# Patient Record
Sex: FEMALE | Race: WHITE | NOT HISPANIC OR LATINO | Employment: OTHER | URBAN - NONMETROPOLITAN AREA
[De-identification: names, ages, dates, MRNs, and addresses within clinical notes are randomized per-mention and may not be internally consistent; named-entity substitution may affect disease eponyms.]

---

## 2017-01-27 ENCOUNTER — ANTICOAGULATION VISIT (OUTPATIENT)
Dept: CARDIOLOGY | Facility: CLINIC | Age: 82
End: 2017-01-27

## 2017-01-27 ENCOUNTER — LAB (OUTPATIENT)
Dept: LAB | Facility: HOSPITAL | Age: 82
End: 2017-01-27

## 2017-01-27 ENCOUNTER — OFFICE VISIT (OUTPATIENT)
Dept: CARDIOLOGY | Facility: CLINIC | Age: 82
End: 2017-01-27

## 2017-01-27 VITALS
DIASTOLIC BLOOD PRESSURE: 70 MMHG | BODY MASS INDEX: 31.99 KG/M2 | HEART RATE: 100 BPM | SYSTOLIC BLOOD PRESSURE: 146 MMHG | HEIGHT: 65 IN | WEIGHT: 192 LBS

## 2017-01-27 DIAGNOSIS — Z79.01 LONG TERM (CURRENT) USE OF ANTICOAGULANTS: ICD-10-CM

## 2017-01-27 DIAGNOSIS — I48.19 PERSISTENT ATRIAL FIBRILLATION (HCC): Primary | ICD-10-CM

## 2017-01-27 DIAGNOSIS — I48.20 CHRONIC ATRIAL FIBRILLATION (HCC): Primary | ICD-10-CM

## 2017-01-27 DIAGNOSIS — E78.2 MIXED HYPERLIPIDEMIA: ICD-10-CM

## 2017-01-27 DIAGNOSIS — Z98.890 S/P MVR (MITRAL VALVE REPAIR): ICD-10-CM

## 2017-01-27 DIAGNOSIS — I10 ESSENTIAL HYPERTENSION: ICD-10-CM

## 2017-01-27 LAB
ALBUMIN SERPL-MCNC: 3.8 G/DL (ref 3.4–4.8)
ALBUMIN/GLOB SERPL: 1.2 G/DL (ref 1.1–1.8)
ALP SERPL-CCNC: 66 U/L (ref 38–126)
ALT SERPL W P-5'-P-CCNC: 31 U/L (ref 9–52)
ANION GAP SERPL CALCULATED.3IONS-SCNC: 8 MMOL/L (ref 5–15)
ARTICHOKE IGE QN: 138 MG/DL (ref 1–129)
AST SERPL-CCNC: 24 U/L (ref 14–36)
BASOPHILS # BLD AUTO: 0.04 10*3/MM3 (ref 0–0.2)
BASOPHILS NFR BLD AUTO: 0.6 % (ref 0–2)
BILIRUB SERPL-MCNC: 0.8 MG/DL (ref 0.2–1.3)
BUN BLD-MCNC: 21 MG/DL (ref 7–21)
BUN/CREAT SERPL: 22.3 (ref 7–25)
CALCIUM SPEC-SCNC: 9.7 MG/DL (ref 8.4–10.2)
CHLORIDE SERPL-SCNC: 103 MMOL/L (ref 95–110)
CHOLEST SERPL-MCNC: 234 MG/DL (ref 0–199)
CO2 SERPL-SCNC: 28 MMOL/L (ref 22–31)
CREAT BLD-MCNC: 0.94 MG/DL (ref 0.5–1)
DEPRECATED RDW RBC AUTO: 48.3 FL (ref 36.4–46.3)
EOSINOPHIL # BLD AUTO: 0.12 10*3/MM3 (ref 0–0.7)
EOSINOPHIL NFR BLD AUTO: 1.9 % (ref 0–7)
ERYTHROCYTE [DISTWIDTH] IN BLOOD BY AUTOMATED COUNT: 14.3 % (ref 11.5–14.5)
GFR SERPL CREATININE-BSD FRML MDRD: 57 ML/MIN/1.73 (ref 60–90)
GLOBULIN UR ELPH-MCNC: 3.3 GM/DL (ref 2.3–3.5)
GLUCOSE BLD-MCNC: 86 MG/DL (ref 60–100)
HCT VFR BLD AUTO: 42 % (ref 35–45)
HDLC SERPL-MCNC: 52 MG/DL (ref 60–200)
HGB BLD-MCNC: 13.8 G/DL (ref 12–15.5)
IMM GRANULOCYTES # BLD: 0 10*3/MM3 (ref 0–0.02)
IMM GRANULOCYTES NFR BLD: 0 % (ref 0–0.5)
INR PPP: 1.5 (ref 0.8–1.2)
INR PPP: 1.6
LDLC/HDLC SERPL: 2.73 {RATIO} (ref 0–3.22)
LYMPHOCYTES # BLD AUTO: 2.35 10*3/MM3 (ref 0.6–4.2)
LYMPHOCYTES NFR BLD AUTO: 38.1 % (ref 10–50)
MCH RBC QN AUTO: 30.7 PG (ref 26.5–34)
MCHC RBC AUTO-ENTMCNC: 32.9 G/DL (ref 31.4–36)
MCV RBC AUTO: 93.3 FL (ref 80–98)
MONOCYTES # BLD AUTO: 0.46 10*3/MM3 (ref 0–0.9)
MONOCYTES NFR BLD AUTO: 7.5 % (ref 0–12)
NEUTROPHILS # BLD AUTO: 3.19 10*3/MM3 (ref 2–8.6)
NEUTROPHILS NFR BLD AUTO: 51.9 % (ref 37–80)
PLATELET # BLD AUTO: 229 10*3/MM3 (ref 150–450)
PMV BLD AUTO: 9.9 FL (ref 8–12)
POTASSIUM BLD-SCNC: 5.5 MMOL/L (ref 3.5–5.1)
PROT SERPL-MCNC: 7.1 G/DL (ref 6.3–8.6)
RBC # BLD AUTO: 4.5 10*6/MM3 (ref 3.77–5.16)
SODIUM BLD-SCNC: 139 MMOL/L (ref 137–145)
T4 FREE SERPL-MCNC: 1.74 NG/DL (ref 0.78–2.19)
TRIGL SERPL-MCNC: 199 MG/DL (ref 20–199)
TSH SERPL DL<=0.05 MIU/L-ACNC: 0.08 MIU/ML (ref 0.46–4.68)
WBC NRBC COR # BLD: 6.16 10*3/MM3 (ref 3.2–9.8)

## 2017-01-27 PROCEDURE — 80053 COMPREHEN METABOLIC PANEL: CPT | Performed by: INTERNAL MEDICINE

## 2017-01-27 PROCEDURE — 80061 LIPID PANEL: CPT | Performed by: INTERNAL MEDICINE

## 2017-01-27 PROCEDURE — 85610 PROTHROMBIN TIME: CPT | Performed by: INTERNAL MEDICINE

## 2017-01-27 PROCEDURE — 36415 COLL VENOUS BLD VENIPUNCTURE: CPT | Performed by: INTERNAL MEDICINE

## 2017-01-27 PROCEDURE — 84439 ASSAY OF FREE THYROXINE: CPT | Performed by: INTERNAL MEDICINE

## 2017-01-27 PROCEDURE — 99213 OFFICE O/P EST LOW 20 MIN: CPT | Performed by: INTERNAL MEDICINE

## 2017-01-27 PROCEDURE — 85025 COMPLETE CBC W/AUTO DIFF WBC: CPT | Performed by: INTERNAL MEDICINE

## 2017-01-27 PROCEDURE — 84443 ASSAY THYROID STIM HORMONE: CPT | Performed by: INTERNAL MEDICINE

## 2017-01-27 NOTE — MR AVS SNAPSHOT
Miah Hogan   1/27/2017 2:15 PM   Office Visit    Dept Phone:  [phone]   Encounter #:  [id number]    Provider:  Kg Adams MD   Department:  [facility] CARDIOLOGY                Your Full Care Plan              Today's Medication Changes          These changes are accurate as of: 1/27/17  1:59 PM.  If you have any questions, ask your nurse or doctor.               Medication(s)that have changed:     levothyroxine 88 MCG tablet   Commonly known as:  SYNTHROID, LEVOTHROID   Take 1 tablet by mouth Daily.   What changed:  Another medication with the same name was removed. Continue taking this medication, and follow the directions you see here.   Changed by:  Celestina Donaldson CSA                  Your Updated Medication List          This list is accurate as of: 1/27/17  1:59 PM.  Always use your most recent med list.                furosemide 20 MG tablet   Commonly known as:  LASIX       levothyroxine 88 MCG tablet   Commonly known as:  SYNTHROID, LEVOTHROID       lisinopril 10 MG tablet   Commonly known as:  PRINIVIL,ZESTRIL       metoprolol succinate XL 25 MG 24 hr tablet   Commonly known as:  TOPROL-XL   Take 1 tablet by mouth Daily.       Vitamin D3 2000 UNITS tablet       warfarin 2.5 MG tablet   Commonly known as:  COUMADIN               We Performed the Following     CBC & Differential     Comprehensive Metabolic Panel     Lipid Panel     Protime-INR     T4     TSH       You Were Diagnosed With        Codes Comments    Persistent atrial fibrillation    -  Primary ICD-10-CM: I48.1  ICD-9-CM: 427.31     Long term (current) use of anticoagulants     ICD-10-CM: Z79.01  ICD-9-CM: V58.61     S/P MVR (mitral valve repair)     ICD-10-CM: Z98.890  ICD-9-CM: V45.89     Mixed hyperlipidemia     ICD-10-CM: E78.2  ICD-9-CM: 272.2     Essential hypertension     ICD-10-CM: I10  ICD-9-CM: 401.9       Instructions     None    Patient Instructions History         Upcoming Appointments     Visit Type Date Time Department    OFFICE VISIT 2017  2:15 PM [facility] HEART [facility] ECHO 2D COMPLETE [place] 2017 10:30 AM [facility]    OFFICE VISIT 2017  1:15 PM [facility]      [facility] Sign[facility]     [facility] [employer] allows you to send messages to your doctor, view your test results, renew your prescriptions, schedule appointments, and more. To sign up, go to [facility] and click on the Sign Up Now link in the New User? box. Enter your [employer] Activation Code exactly as it appears below along with the last four digits of your Social Security Number and your Date of Birth () to complete the sign-up process. If you do not sign up before the expiration date, you must request a new code.    [employer] Activation Code: [email or web address]  Expires: 2/10/2017  1:59 PM    If you have questions, you can email [email or web address] or call [phone] to talk to our [employer] staff. Remember, [employer] is NOT to be used for urgent needs. For medical emergencies, dial 911.               Other Info from Your Visit           Your Appointments     2017  2:15 PM CST   Office Visit with Kg Adams MD   [facility] CARDIOLOGY (--)    [address]  [facility] [address] [phone]           Arrive 15 minutes prior to appointment.            2017 10:30 AM CST   ECHOCARDIOGRAM 2D COMPLETE VISIT with [facility]    [address]  [facility] [address] [phone]           Bring your insurance cards with you. Wear comfortable clothing and shoes.            2017  1:15 PM CDT   Office Visit with Kg Adams MD   [facility] CARDIOLOGY (--)    [address]  [facility] [address] [phone]           Arrive 15 minutes prior to appointment.             "  Allergies     Codeine Sulfate        Vital Signs     Blood Pressure Pulse Height Weight Body Mass Index Smoking Status    146/70 100 65\" (165.1 cm) 192 lb (87.1 kg) 31.95 kg/m2 Never Smoker      Problems and Diagnoses Noted     Atrial fibrillation (irregular heartbeat)    High blood pressure    Long term use of blood thinners    Mixed hyperlipidemia    Status post mitral valve repair        "

## 2017-01-27 NOTE — PROGRESS NOTES
Miah Hogan  81 y.o. female    01/27/2017  1. Persistent atrial fibrillation    2. Long term (current) use of anticoagulants    3. S/P MVR (mitral valve repair)    4. Mixed hyperlipidemia    5. Essential hypertension        History of Present Illness    Mrs. Hogan is here for follow-up of her multiple cardiac issues.  She denied any chest pain, shortness of breath, palpitation but does have 1+ pitting edema in the lower extremities which is a relatively new symptom.  She denied any fevers, chills, cough.  She has been very compliant with her medications and a blood pressure was in the normal range.  No signs of congestive heart failure was noted and clinically her prosthetic valve is functioning well.        SUBJECTIVE    Allergies   Allergen Reactions   • Codeine Sulfate          Past Medical History   Diagnosis Date   • Atrial fibrillation    • Dizziness and giddiness    • Edema    • Hyperlipidemia    • Hypertension    • Hypothyroidism    • Hypothyroidism    • Long term (current) use of anticoagulants    • Nonrheumatic mitral valve disorder, unspecified          Past Surgical History   Procedure Laterality Date   • Mitral valve replacement     • Appendectomy           No family history on file.      Social History     Social History   • Marital status:      Spouse name: N/A   • Number of children: N/A   • Years of education: N/A     Occupational History   • Not on file.     Social History Main Topics   • Smoking status: Never Smoker   • Smokeless tobacco: Never Used   • Alcohol use No   • Drug use: No   • Sexual activity: Defer     Other Topics Concern   • Not on file     Social History Narrative         Current Outpatient Prescriptions   Medication Sig Dispense Refill   • Cholecalciferol (VITAMIN D3) 2000 UNITS tablet Take  by mouth.     • furosemide (LASIX) 20 MG tablet Take 20 mg by mouth 2 (Two) Times a Day.     • levothyroxine (SYNTHROID, LEVOTHROID) 88 MCG tablet Take 1 tablet by mouth Daily.   "6   • lisinopril (PRINIVIL,ZESTRIL) 10 MG tablet Take 10 mg by mouth Daily.     • metoprolol succinate XL (TOPROL-XL) 25 MG 24 hr tablet Take 1 tablet by mouth Daily. 30 tablet 6   • warfarin (COUMADIN) 2.5 MG tablet Take 2.5 mg by mouth Daily.       No current facility-administered medications for this visit.          OBJECTIVE    Visit Vitals   • /70   • Pulse 100   • Ht 65\" (165.1 cm)   • Wt 192 lb (87.1 kg)   • BMI 31.95 kg/m2           Review of Systems     Constitutional:  Denies recent weight loss, weight gain, fever or chills, no change in exercise tolerance     HENT:  Denies any hearing loss, epistaxis, hoarseness, or difficulty speaking.     Eyes: Wears eyeglasses or contact lenses     Respiratory:  Denies dyspnea with exertion,no cough, wheezing, or hemoptysis.     Cardiovascular: Negative for palpations, chest pain, orthopnea, PND, syncope, or claudication.     Gastrointestinal:  Denies change in bowel habits, dyspepsia, ulcer disease, hematochezia, or melena.     Endocrine: Negative for cold intolerance, heat intolerance, polydipsia, polyphagia and polyuria. Denies any history of weight change, heat/cold intolerance, polydipsia, polyuria     Genitourinary: Negative.      Musculoskeletal: Denies any history of arthritic symptoms or back problems . Leg edema    Skin:  Denies any change in hair or nails, rashes, or skin lesions.     Allergic/Immunologic: Negative.  Negative for environmental allergies, food allergies and immunocompromised state.     Neurological:  Denies any history of recurrent headaches, strokes, TIA, or seizure disorder.     Hematological: Denies any food allergies, seasonal allergies, bleeding disorders, or lymphadenopathy.     Psychiatric/Behavioral: Denies any history of depression, substance abuse, or change in cognitive function.         Physical Exam     Constitutional: Cooperative, alert and oriented, well-developed, well-nourished, in no acute distress.     HENT:   Head: " Normocephalic, normal hair patterns, no masses or tenderness.  Ears, Nose, and Throat: No gross abnormalities. No pallor or cyanosis. Dentition good.   Eyes: EOMS intact, PERRL, conjunctivae and lids unremarkable. Fundoscopic exam and visual fields not performed.   Neck: No palpable masses or adenopathy, no thyromegaly, no JVD, carotid pulses are full and equal bilaterally and without  Bruits.     Cardiovascular: Irregular rhythm, Prosthetic valve sounds,  No murmurs, gallops, or rubs detected.     Pulmonary/Chest: Chest: normal symmetry, no tenderness to palpation, normal respiratory excursion, no intercostal retraction, no use of accessory muscles.            Pulmonary: Normal breath sounds. No rales or ronchi.    Abdominal: Abdomen soft, bowel sounds normoactive, no masses, no hepatosplenomegaly, non-tender, no bruits.     Musculoskeletal: No deformities, 1+ edema both legs. There are no spinal abnormalities noted. Normal muscle strength and tone. Pulses full and equal in all extremities, no bruits auscultated.     Neurological: No gross motor or sensory deficits noted, affect appropriate, oriented to time, person, place.     Skin: Warm and dry to the touch, no apparent skin lesions or masses noted.     Psychiatric: She has a normal mood and affect. Her behavior is normal. Judgment and thought content normal.         Procedures      Lab Results   Component Value Date    WBC 5.8 09/01/2015    HGB 12.9 09/01/2015    HCT 41.1 09/01/2015    MCV 92.8 09/01/2015     09/01/2015     Lab Results   Component Value Date    GLUCOSE 86 09/01/2015    BUN 17 09/01/2015    CREATININE 1.0 09/01/2015    CO2 28 09/01/2015    CALCIUM 9.0 09/01/2015    ALBUMIN 3.3 (L) 09/01/2015    AST 20 09/01/2015    ALT 21 09/01/2015     No results found for: CHOL  Lab Results   Component Value Date    TRIG 190 09/01/2015     No results found for: HDL  Lab Results   Component Value Date    LDLCALC 127 09/01/2015     No results found for:  LDL  No results found for: HDLLDLRATIO  No components found for: CHOLHDL  No results found for: HGBA1C  Lab Results   Component Value Date    TSH 0.22 (L) 09/01/2015           ASSESSMENT AND PLAN  Mrs. Hogan is compensated with no evidence of congestive heart failure.  His suspect that her leg edema is secondary to venous stasis.  However to further evaluate her prosthetic valve and LV function and right heart pressures and echocardiogram is being arranged her RV systolic pressure was 38 mmHg in August 2015.  I note that she is not had lab work done in a long time and hence this indicated below have been ordered.  She has hyperlipidemia but is unable to take statins secondary to side effects.    Diagnoses and all orders for this visit:    Persistent atrial fibrillation  -     Comprehensive Metabolic Panel  -     CBC & Differential  -     TSH  -     T4  -     Protime-INR  -     Adult Transthoracic Echo Complete; Future  -     Lipid Panel    Long term (current) use of anticoagulants  -     Comprehensive Metabolic Panel  -     CBC & Differential  -     TSH  -     T4  -     Protime-INR  -     Adult Transthoracic Echo Complete; Future  -     Lipid Panel    S/P MVR (mitral valve repair)  -     Comprehensive Metabolic Panel  -     CBC & Differential  -     TSH  -     T4  -     Protime-INR  -     Adult Transthoracic Echo Complete; Future  -     Lipid Panel    Mixed hyperlipidemia  -     Lipid Panel    Essential hypertension  -     Lipid Panel        Kg Adams MD  1/27/2017  1:38 PM

## 2017-01-27 NOTE — PROGRESS NOTES
I have reviewed the notes, assessments, and/or procedures performed by Luis Friedman RN,and concur with her documentation of Miah Hogan.

## 2017-02-01 ENCOUNTER — RESULTS ENCOUNTER (OUTPATIENT)
Dept: CARDIOLOGY | Facility: CLINIC | Age: 82
End: 2017-02-01

## 2017-02-01 DIAGNOSIS — I48.20 CHRONIC ATRIAL FIBRILLATION (HCC): ICD-10-CM

## 2017-02-09 ENCOUNTER — DOCUMENTATION (OUTPATIENT)
Dept: CARDIOLOGY | Facility: CLINIC | Age: 82
End: 2017-02-09

## 2017-02-17 ENCOUNTER — ANTICOAGULATION VISIT (OUTPATIENT)
Dept: CARDIOLOGY | Facility: CLINIC | Age: 82
End: 2017-02-17

## 2017-02-17 LAB — INR PPP: 2.1

## 2017-02-19 NOTE — PROGRESS NOTES
I have reviewed the notes, assessments, and/or procedures performed by Luis Friedman RN, and concur with her documentation of Miah Hogan.

## 2017-02-24 ENCOUNTER — RESULTS ENCOUNTER (OUTPATIENT)
Dept: CARDIOLOGY | Facility: CLINIC | Age: 82
End: 2017-02-24

## 2017-02-24 DIAGNOSIS — I48.20 CHRONIC ATRIAL FIBRILLATION (HCC): ICD-10-CM

## 2017-03-24 ENCOUNTER — RESULTS ENCOUNTER (OUTPATIENT)
Dept: CARDIOLOGY | Facility: CLINIC | Age: 82
End: 2017-03-24

## 2017-03-24 DIAGNOSIS — I48.20 CHRONIC ATRIAL FIBRILLATION (HCC): ICD-10-CM

## 2017-04-04 ENCOUNTER — ANTICOAGULATION VISIT (OUTPATIENT)
Dept: CARDIOLOGY | Facility: CLINIC | Age: 82
End: 2017-04-04

## 2017-04-04 ENCOUNTER — APPOINTMENT (OUTPATIENT)
Dept: LAB | Facility: HOSPITAL | Age: 82
End: 2017-04-04

## 2017-04-04 DIAGNOSIS — I48.19 PERSISTENT ATRIAL FIBRILLATION (HCC): Primary | ICD-10-CM

## 2017-04-04 LAB
INR PPP: 2.3
INR PPP: 2.3 (ref 0.8–1.2)

## 2017-04-04 PROCEDURE — 85610 PROTHROMBIN TIME: CPT | Performed by: INTERNAL MEDICINE

## 2017-04-21 ENCOUNTER — RESULTS ENCOUNTER (OUTPATIENT)
Dept: CARDIOLOGY | Facility: CLINIC | Age: 82
End: 2017-04-21

## 2017-04-21 DIAGNOSIS — I48.20 CHRONIC ATRIAL FIBRILLATION (HCC): ICD-10-CM

## 2017-05-19 ENCOUNTER — RESULTS ENCOUNTER (OUTPATIENT)
Dept: CARDIOLOGY | Facility: CLINIC | Age: 82
End: 2017-05-19

## 2017-05-19 DIAGNOSIS — I48.20 CHRONIC ATRIAL FIBRILLATION (HCC): ICD-10-CM

## 2017-05-26 RX ORDER — METOPROLOL SUCCINATE 25 MG/1
25 TABLET, EXTENDED RELEASE ORAL DAILY
Qty: 30 TABLET | Refills: 6 | Status: SHIPPED | OUTPATIENT
Start: 2017-05-26 | End: 2017-12-26 | Stop reason: SDUPTHER

## 2017-06-16 ENCOUNTER — RESULTS ENCOUNTER (OUTPATIENT)
Dept: CARDIOLOGY | Facility: CLINIC | Age: 82
End: 2017-06-16

## 2017-06-16 DIAGNOSIS — I48.20 CHRONIC ATRIAL FIBRILLATION (HCC): ICD-10-CM

## 2017-06-27 ENCOUNTER — ANTICOAGULATION VISIT (OUTPATIENT)
Dept: CARDIOLOGY | Facility: CLINIC | Age: 82
End: 2017-06-27

## 2017-06-27 LAB — INR PPP: 1.8

## 2017-07-07 RX ORDER — WARFARIN SODIUM 2.5 MG/1
2.5 TABLET ORAL
Qty: 30 TABLET | Refills: 6 | Status: SHIPPED | OUTPATIENT
Start: 2017-07-07 | End: 2018-08-16 | Stop reason: SDUPTHER

## 2017-07-14 ENCOUNTER — RESULTS ENCOUNTER (OUTPATIENT)
Dept: CARDIOLOGY | Facility: CLINIC | Age: 82
End: 2017-07-14

## 2017-07-14 DIAGNOSIS — I48.20 CHRONIC ATRIAL FIBRILLATION (HCC): ICD-10-CM

## 2017-08-02 ENCOUNTER — ANTICOAGULATION VISIT (OUTPATIENT)
Dept: CARDIOLOGY | Facility: CLINIC | Age: 82
End: 2017-08-02

## 2017-08-02 ENCOUNTER — OFFICE VISIT (OUTPATIENT)
Dept: CARDIOLOGY | Facility: CLINIC | Age: 82
End: 2017-08-02

## 2017-08-02 VITALS
DIASTOLIC BLOOD PRESSURE: 100 MMHG | BODY MASS INDEX: 32.49 KG/M2 | SYSTOLIC BLOOD PRESSURE: 140 MMHG | HEIGHT: 65 IN | HEART RATE: 88 BPM | WEIGHT: 195 LBS

## 2017-08-02 DIAGNOSIS — I10 ESSENTIAL HYPERTENSION: ICD-10-CM

## 2017-08-02 DIAGNOSIS — I48.19 PERSISTENT ATRIAL FIBRILLATION (HCC): Primary | ICD-10-CM

## 2017-08-02 DIAGNOSIS — Z98.890 S/P MVR (MITRAL VALVE REPAIR): ICD-10-CM

## 2017-08-02 DIAGNOSIS — E78.2 MIXED HYPERLIPIDEMIA: ICD-10-CM

## 2017-08-02 LAB
INR PPP: 2.3
INR PPP: 2.3

## 2017-08-02 PROCEDURE — 99214 OFFICE O/P EST MOD 30 MIN: CPT | Performed by: INTERNAL MEDICINE

## 2017-08-02 PROCEDURE — 93000 ELECTROCARDIOGRAM COMPLETE: CPT | Performed by: INTERNAL MEDICINE

## 2017-08-02 NOTE — PROGRESS NOTES
Miah Hogan  81 y.o. female    08/02/2017  1. Persistent atrial fibrillation    2. Essential hypertension    3. Mixed hyperlipidemia    4. S/P MVR (mitral valve repair)        History of Present Illness    Mrs. Hogan is here for follow-up of for above stated problems.  She denied any chest pain, palpitation, dizziness or syncope.  There was no shortness of breath.  No signs of congestive heart failure were noted.  Her mechanical prosthetic valve is functioning well.  EKG showed a heart rate of 88 bpm with underlying atrial fibrillation with nonspecific T-wave changes.  She has not had her PT and INR checked in more than a month and hence this will be checked today.  Her blood pressure was noted to be elevated at 140/100 mmHg and when I rechecked it was 130/80 mmHg.  I've asked her to keep a close watch on her blood pressure.        SUBJECTIVE    Allergies   Allergen Reactions   • Codeine Sulfate          Past Medical History:   Diagnosis Date   • Atrial fibrillation    • Dizziness and giddiness    • Edema    • Hyperlipidemia    • Hypertension    • Hypothyroidism    • Hypothyroidism    • Long term (current) use of anticoagulants    • Nonrheumatic mitral valve disorder, unspecified          Past Surgical History:   Procedure Laterality Date   • APPENDECTOMY     • MITRAL VALVE REPLACEMENT           History reviewed. No pertinent family history.      Social History     Social History   • Marital status:      Spouse name: N/A   • Number of children: N/A   • Years of education: N/A     Occupational History   • Not on file.     Social History Main Topics   • Smoking status: Never Smoker   • Smokeless tobacco: Never Used   • Alcohol use No   • Drug use: No   • Sexual activity: Defer     Other Topics Concern   • Not on file     Social History Narrative         Current Outpatient Prescriptions   Medication Sig Dispense Refill   • Cholecalciferol (VITAMIN D3) 2000 UNITS tablet Take  by mouth.     • furosemide  "(LASIX) 20 MG tablet Take 20 mg by mouth 2 (Two) Times a Day.     • levothyroxine (SYNTHROID, LEVOTHROID) 88 MCG tablet Take 1 tablet by mouth Daily.  6   • lisinopril (PRINIVIL,ZESTRIL) 10 MG tablet Take 10 mg by mouth Daily.     • metoprolol succinate XL (TOPROL-XL) 25 MG 24 hr tablet Take 1 tablet by mouth Daily. 30 tablet 6   • warfarin (COUMADIN) 2.5 MG tablet Take 1 tablet by mouth Daily. 30 tablet 6     No current facility-administered medications for this visit.          OBJECTIVE    /100  Pulse 88  Ht 65\" (165.1 cm)  Wt 195 lb (88.5 kg)  BMI 32.45 kg/m2        Review of Systems     Constitutional:  Denies recent weight loss, weight gain, fever or chills, no change in exercise tolerance     HENT:  Denies any hearing loss, epistaxis, hoarseness, or difficulty speaking.     Eyes: Wears eyeglasses or contact lenses     Respiratory:  Denies dyspnea with exertion,no cough, wheezing, or hemoptysis.     Cardiovascular: Negative for palpations, chest pain, orthopnea, PND, peripheral edema, syncope, or claudication.     Gastrointestinal:  Denies change in bowel habits, dyspepsia, ulcer disease, hematochezia, or melena.     Endocrine: Negative for cold intolerance, heat intolerance, polydipsia, polyphagia and polyuria.    Genitourinary: Negative.      Musculoskeletal: Denies any history of arthritic symptoms or back problems     Skin:  Denies any change in hair or nails, rashes, or skin lesions.     Allergic/Immunologic: Negative.  Negative for environmental allergies, food allergies and immunocompromised state.     Neurological: oaacsional headaches, no strokes, TIA, or seizure disorder.     Hematological: Denies any food allergies, seasonal allergies, bleeding disorders, or lymphadenopathy.     Psychiatric/Behavioral: Denies any history of depression, substance abuse, or change in cognitive function.         Physical Exam     Constitutional: Cooperative, alert and oriented,  in no acute distress.     HENT: "   Head: Normocephalic, normal hair patterns, no masses or tenderness.  Ears, Nose, and Throat: No gross abnormalities. No pallor or cyanosis.   Eyes: EOMS intact, PERRL, conjunctivae and lids unremarkable. Fundoscopic exam and visual fields not performed.   Neck: No palpable masses or adenopathy, no thyromegaly, no JVD, carotid pulses are full and equal bilaterally and without  Bruits.     Cardiovascular: Prosthetic valve sounds heard, no S3 or S4. No gallops, or rubs detected.     Pulmonary/Chest: Chest: normal symmetry, no tenderness to palpation, no intercostal retraction, no use of accessory muscles.            Pulmonary: Normal breath sounds. No rales or ronchi.    Abdominal: Abdomen soft, bowel sounds normoactive, no masses, no hepatosplenomegaly, non-tender, no bruits.     Musculoskeletal: No deformities, clubbing, cyanosis, erythema, or edema observed.     Neurological: No gross motor or sensory deficits noted, affect appropriate, oriented to time, person, place.     Skin: Warm and dry to the touch, no apparent skin lesions or masses noted.     Psychiatric: She has a normal mood and affect. Her behavior is normal. Judgment and thought content normal.           ECG 12 Lead  Date/Time: 8/2/2017 11:29 AM  Performed by: ZAC HERNANDEZ  Authorized by: ZAC HERNANDEZ   Comparison: not compared with previous ECG   Rhythm: atrial fibrillation  Rate: normal  QRS axis: normal  Comments: EKG showed sinus rhythm heart rate of 88 bpm.  Minimal nonspecific T-wave changes.              Lab Results   Component Value Date    WBC 6.16 01/27/2017    HGB 13.8 01/27/2017    HCT 42.0 01/27/2017    MCV 93.3 01/27/2017     01/27/2017     Lab Results   Component Value Date    GLUCOSE 86 01/27/2017    BUN 21 01/27/2017    CREATININE 0.94 01/27/2017    EGFRIFNONA 57 (L) 01/27/2017    BCR 22.3 01/27/2017    CO2 28.0 01/27/2017    CALCIUM 9.7 01/27/2017    ALBUMIN 3.80 01/27/2017    LABIL2 1.2 01/27/2017     AST 24 01/27/2017    ALT 31 01/27/2017     Lab Results   Component Value Date    CHOL 234 (H) 01/27/2017     Lab Results   Component Value Date    TRIG 199 01/27/2017    TRIG 190 09/01/2015     Lab Results   Component Value Date    HDL 52 (L) 01/27/2017     Lab Results   Component Value Date    LDLCALC 127 09/01/2015     No results found for: LDL  No results found for: HDLLDLRATIO  No components found for: CHOLHDL  No results found for: HGBA1C  Lab Results   Component Value Date    TSH 0.080 (L) 01/27/2017           ASSESSMENT AND PLAN    Mrs. Hogan is stable with no evidence of angina or congestive heart failure.  Her mechanical prosthetic valve is functioning well.  I have continued her present combination of lisinopril, Toprol-XL and Lasix and anticoagulation with warfarin has been continued.  Her PT and INR will be checked today and the dose of Coumadin adjusted.  I have asked her to keep a close watch on her blood pressure and if this is elevated in the future we could consider increasing the dose of lisinopril to 20 mg daily.  Diagnoses and all orders for this visit:    Persistent atrial fibrillation    Essential hypertension    Mixed hyperlipidemia    S/P MVR (mitral valve repair)        Kg Adams MD  8/2/2017  11:24 AM

## 2017-08-11 ENCOUNTER — RESULTS ENCOUNTER (OUTPATIENT)
Dept: CARDIOLOGY | Facility: CLINIC | Age: 82
End: 2017-08-11

## 2017-08-11 DIAGNOSIS — I48.20 CHRONIC ATRIAL FIBRILLATION (HCC): ICD-10-CM

## 2017-09-08 ENCOUNTER — RESULTS ENCOUNTER (OUTPATIENT)
Dept: CARDIOLOGY | Facility: CLINIC | Age: 82
End: 2017-09-08

## 2017-09-08 DIAGNOSIS — I48.20 CHRONIC ATRIAL FIBRILLATION (HCC): ICD-10-CM

## 2017-10-06 ENCOUNTER — RESULTS ENCOUNTER (OUTPATIENT)
Dept: CARDIOLOGY | Facility: CLINIC | Age: 82
End: 2017-10-06

## 2017-10-06 DIAGNOSIS — I48.20 CHRONIC ATRIAL FIBRILLATION (HCC): ICD-10-CM

## 2017-11-03 ENCOUNTER — RESULTS ENCOUNTER (OUTPATIENT)
Dept: CARDIOLOGY | Facility: CLINIC | Age: 82
End: 2017-11-03

## 2017-11-03 DIAGNOSIS — I48.20 CHRONIC ATRIAL FIBRILLATION (HCC): ICD-10-CM

## 2017-11-09 DIAGNOSIS — I48.20 CHRONIC ATRIAL FIBRILLATION (HCC): Primary | ICD-10-CM

## 2017-11-14 ENCOUNTER — ANTICOAGULATION VISIT (OUTPATIENT)
Dept: CARDIOLOGY | Facility: CLINIC | Age: 82
End: 2017-11-14

## 2017-11-14 LAB — INR PPP: 1.8

## 2017-12-01 ENCOUNTER — RESULTS ENCOUNTER (OUTPATIENT)
Dept: CARDIOLOGY | Facility: CLINIC | Age: 82
End: 2017-12-01

## 2017-12-01 DIAGNOSIS — I48.20 CHRONIC ATRIAL FIBRILLATION (HCC): ICD-10-CM

## 2017-12-13 ENCOUNTER — DOCUMENTATION (OUTPATIENT)
Dept: CARDIAC SURGERY | Facility: CLINIC | Age: 82
End: 2017-12-13

## 2017-12-13 NOTE — PROGRESS NOTES
Late entry for 12/12- Attempted to call pt and schedule appt with [facility]. Left message for pt to call us back.

## 2017-12-19 ENCOUNTER — DOCUMENTATION (OUTPATIENT)
Dept: CARDIAC SURGERY | Facility: CLINIC | Age: 82
End: 2017-12-19

## 2017-12-19 NOTE — PROGRESS NOTES
Spoke to pt regarding merge with [facility] and the need for an appt. Pt states she will need to talk to her  and call back for an appt.

## 2017-12-26 RX ORDER — METOPROLOL SUCCINATE 25 MG/1
25 TABLET, EXTENDED RELEASE ORAL DAILY
Qty: 30 TABLET | Refills: 6 | Status: SHIPPED | OUTPATIENT
Start: 2017-12-26 | End: 2018-03-15

## 2017-12-29 ENCOUNTER — ANTICOAGULATION VISIT (OUTPATIENT)
Dept: CARDIAC SURGERY | Facility: CLINIC | Age: 82
End: 2017-12-29

## 2017-12-29 ENCOUNTER — RESULTS ENCOUNTER (OUTPATIENT)
Dept: CARDIOLOGY | Facility: CLINIC | Age: 82
End: 2017-12-29

## 2017-12-29 DIAGNOSIS — Z98.890 S/P MVR (MITRAL VALVE REPAIR): ICD-10-CM

## 2017-12-29 DIAGNOSIS — Z79.01 LONG TERM CURRENT USE OF ANTICOAGULANT THERAPY: ICD-10-CM

## 2017-12-29 DIAGNOSIS — I48.20 CHRONIC ATRIAL FIBRILLATION (HCC): ICD-10-CM

## 2017-12-29 DIAGNOSIS — I48.91 ATRIAL FIBRILLATION, UNSPECIFIED TYPE (HCC): ICD-10-CM

## 2017-12-29 LAB — INR PPP: 2.5 (ref 0.9–1.1)

## 2017-12-29 PROCEDURE — 85610 PROTHROMBIN TIME: CPT | Performed by: NURSE PRACTITIONER

## 2017-12-29 PROCEDURE — 99211 OFF/OP EST MAY X REQ PHY/QHP: CPT | Performed by: NURSE PRACTITIONER

## 2017-12-29 NOTE — PROGRESS NOTES
Pt here today for first visit with [facility] ClicicPT states compliant c tx. Denies any bleeding issues or s/s of blood clot. PT is starting Amoxicillin today for 10 days. PT does not wish to return to [facility] for 1 month so dose was decreased only 1 day and pt will consume green every 3 days. Appt made. Patient instructed regarding medication; results given and questions answered. Nutritional counseling given.  Dietary factors affecting therapy addressed.  Patient instructed to monitor for excessive bruising or bleeding. PT verbalizes understanding.   Electronically signed by EBONY Moeller

## 2018-01-24 ENCOUNTER — DOCUMENTATION (OUTPATIENT)
Dept: CARDIAC SURGERY | Facility: CLINIC | Age: 83
End: 2018-01-24

## 2018-01-30 ENCOUNTER — ANTICOAGULATION VISIT (OUTPATIENT)
Dept: CARDIAC SURGERY | Facility: CLINIC | Age: 83
End: 2018-01-30

## 2018-01-30 VITALS — SYSTOLIC BLOOD PRESSURE: 123 MMHG | DIASTOLIC BLOOD PRESSURE: 69 MMHG | HEART RATE: 84 BPM

## 2018-01-30 DIAGNOSIS — I48.91 ATRIAL FIBRILLATION, UNSPECIFIED TYPE (HCC): ICD-10-CM

## 2018-01-30 DIAGNOSIS — Z79.01 LONG TERM CURRENT USE OF ANTICOAGULANT THERAPY: ICD-10-CM

## 2018-01-30 DIAGNOSIS — Z98.890 S/P MVR (MITRAL VALVE REPAIR): ICD-10-CM

## 2018-01-30 LAB — INR PPP: 1.7 (ref 0.9–1.1)

## 2018-01-30 PROCEDURE — 85610 PROTHROMBIN TIME: CPT | Performed by: NURSE PRACTITIONER

## 2018-01-30 PROCEDURE — 99211 OFF/OP EST MAY X REQ PHY/QHP: CPT | Performed by: NURSE PRACTITIONER

## 2018-01-30 NOTE — PROGRESS NOTES
Pt denies med changes or bleeding problems. Pt denies missed doses or excessive vit K intake. Pt did not wish to return for 1 month but agreed to come next week since she will be here seeing Dr Adams anyways. Dose adjusted today and pt instructed to avoid green veggies 3-4 days; pt verbalized. Patient instructed regarding medication; results given and questions answered. Nutritional counseling given.  Dietary factors affecting therapy addressed.  Patient instructed to monitor for excessive bruising or bleeding. Will recheck next week.    **Actual dose today 6.25mg -- computer only allows 0.5mg increments therefore it shows 6.5mg  Electronically signed by EBONY Moeller

## 2018-02-27 ENCOUNTER — ANTICOAGULATION VISIT (OUTPATIENT)
Dept: CARDIAC SURGERY | Facility: CLINIC | Age: 83
End: 2018-02-27

## 2018-02-27 VITALS — DIASTOLIC BLOOD PRESSURE: 71 MMHG | HEART RATE: 84 BPM | SYSTOLIC BLOOD PRESSURE: 125 MMHG

## 2018-02-27 DIAGNOSIS — Z98.890 S/P MVR (MITRAL VALVE REPAIR): ICD-10-CM

## 2018-02-27 DIAGNOSIS — I48.91 ATRIAL FIBRILLATION, UNSPECIFIED TYPE (HCC): ICD-10-CM

## 2018-02-27 DIAGNOSIS — Z79.01 LONG TERM CURRENT USE OF ANTICOAGULANT THERAPY: ICD-10-CM

## 2018-02-27 LAB — INR PPP: 1.6 (ref 0.9–1.1)

## 2018-02-27 PROCEDURE — 99211 OFF/OP EST MAY X REQ PHY/QHP: CPT | Performed by: NURSE PRACTITIONER

## 2018-02-27 PROCEDURE — 85610 PROTHROMBIN TIME: CPT | Performed by: NURSE PRACTITIONER

## 2018-02-27 NOTE — PROGRESS NOTES
Pt is here today for overdue INR. Pt states she picked up a new medication last Friday. I verified with [facility] that pt is taking Macrobid. Pt denies bleeding problems. Due to INR being low again dose was adjusted. Patient instructed regarding medication; results given and questions answered. Nutritional counseling given.  Dietary factors affecting therapy addressed.  Patient instructed to monitor for excessive bruising or bleeding. Will recheck in 2 weeks when pt agrees to return.           This document has been electronically signed by EBONY Morocho on February 28, 2018 4:22 PM

## 2018-03-13 ENCOUNTER — ANTICOAGULATION VISIT (OUTPATIENT)
Dept: CARDIAC SURGERY | Facility: CLINIC | Age: 83
End: 2018-03-13

## 2018-03-13 VITALS — SYSTOLIC BLOOD PRESSURE: 118 MMHG | HEART RATE: 88 BPM | DIASTOLIC BLOOD PRESSURE: 60 MMHG

## 2018-03-13 DIAGNOSIS — Z98.890 S/P MVR (MITRAL VALVE REPAIR): ICD-10-CM

## 2018-03-13 DIAGNOSIS — Z79.01 LONG TERM CURRENT USE OF ANTICOAGULANT THERAPY: ICD-10-CM

## 2018-03-13 DIAGNOSIS — I48.91 ATRIAL FIBRILLATION, UNSPECIFIED TYPE (HCC): ICD-10-CM

## 2018-03-13 LAB — INR PPP: 3.1 (ref 0.9–1.1)

## 2018-03-13 PROCEDURE — 99211 OFF/OP EST MAY X REQ PHY/QHP: CPT | Performed by: NURSE PRACTITIONER

## 2018-03-13 PROCEDURE — 85610 PROTHROMBIN TIME: CPT | Performed by: NURSE PRACTITIONER

## 2018-03-13 NOTE — PROGRESS NOTES
Pt states she is being taken off her metoprolol and Vit D.  Pt denies bleeding issues.  Adjusted coumadin dose for today only and instructed pt to increase Vit K intake today with a dark salad.  Recheck INR in two wks.  Pt verbalizes.  Patient instructed regarding medication; results given and questions answered. Nutritional counseling given.  Dietary factors affecting therapy addressed.  Patient instructed to monitor for excessive bruising or bleeding.          This document has been electronically signed by EBONY Morocho on March 14, 2018 9:37 AM

## 2018-03-15 ENCOUNTER — APPOINTMENT (OUTPATIENT)
Dept: GENERAL RADIOLOGY | Facility: HOSPITAL | Age: 83
End: 2018-03-15

## 2018-03-15 ENCOUNTER — HOSPITAL ENCOUNTER (OUTPATIENT)
Facility: HOSPITAL | Age: 83
Setting detail: OBSERVATION
Discharge: HOME | End: 2018-03-16
Attending: EMERGENCY MEDICINE | Admitting: FAMILY MEDICINE

## 2018-03-15 ENCOUNTER — DOCUMENTATION (OUTPATIENT)
Dept: CARDIOLOGY | Facility: CLINIC | Age: 83
End: 2018-03-15

## 2018-03-15 DIAGNOSIS — I48.19 ATRIAL FIBRILLATION, PERSISTENT (HCC): Primary | ICD-10-CM

## 2018-03-15 DIAGNOSIS — I16.0 HYPERTENSIVE URGENCY: ICD-10-CM

## 2018-03-15 DIAGNOSIS — L50.9 URTICARIA: ICD-10-CM

## 2018-03-15 PROBLEM — T78.3XXA ANGIOEDEMA: Status: ACTIVE | Noted: 2018-03-15

## 2018-03-15 LAB
ALBUMIN SERPL-MCNC: 3.7 G/DL (ref 3.4–4.8)
ALBUMIN/GLOB SERPL: 1.2 G/DL (ref 1.1–1.8)
ALP SERPL-CCNC: 65 U/L (ref 38–126)
ALT SERPL W P-5'-P-CCNC: 34 U/L (ref 9–52)
ANION GAP SERPL CALCULATED.3IONS-SCNC: 13 MMOL/L (ref 5–15)
APTT PPP: 58.2 SECONDS (ref 20–40.3)
AST SERPL-CCNC: 28 U/L (ref 14–36)
BASOPHILS # BLD AUTO: 0.01 10*3/MM3 (ref 0–0.2)
BASOPHILS NFR BLD AUTO: 0.1 % (ref 0–2)
BILIRUB SERPL-MCNC: 1 MG/DL (ref 0.2–1.3)
BUN BLD-MCNC: 29 MG/DL (ref 7–21)
BUN/CREAT SERPL: 33.7 (ref 7–25)
CALCIUM SPEC-SCNC: 9.2 MG/DL (ref 8.4–10.2)
CHLORIDE SERPL-SCNC: 105 MMOL/L (ref 95–110)
CO2 SERPL-SCNC: 25 MMOL/L (ref 22–31)
CREAT BLD-MCNC: 0.86 MG/DL (ref 0.5–1)
DEPRECATED RDW RBC AUTO: 48.6 FL (ref 36.4–46.3)
EOSINOPHIL # BLD AUTO: 0.07 10*3/MM3 (ref 0–0.7)
EOSINOPHIL NFR BLD AUTO: 0.6 % (ref 0–7)
ERYTHROCYTE [DISTWIDTH] IN BLOOD BY AUTOMATED COUNT: 14.4 % (ref 11.5–14.5)
GFR SERPL CREATININE-BSD FRML MDRD: 63 ML/MIN/1.73 (ref 39–90)
GLOBULIN UR ELPH-MCNC: 3.2 GM/DL (ref 2.3–3.5)
GLUCOSE BLD-MCNC: 98 MG/DL (ref 60–100)
HCT VFR BLD AUTO: 45.8 % (ref 35–45)
HGB BLD-MCNC: 15.4 G/DL (ref 12–15.5)
HOLD SPECIMEN: NORMAL
HOLD SPECIMEN: NORMAL
IMM GRANULOCYTES # BLD: 0.04 10*3/MM3 (ref 0–0.02)
IMM GRANULOCYTES NFR BLD: 0.3 % (ref 0–0.5)
INR PPP: 3.18 (ref 0.8–1.2)
LYMPHOCYTES # BLD AUTO: 3.05 10*3/MM3 (ref 0.6–4.2)
LYMPHOCYTES NFR BLD AUTO: 25.8 % (ref 10–50)
MCH RBC QN AUTO: 31.1 PG (ref 26.5–34)
MCHC RBC AUTO-ENTMCNC: 33.6 G/DL (ref 31.4–36)
MCV RBC AUTO: 92.5 FL (ref 80–98)
MONOCYTES # BLD AUTO: 0.74 10*3/MM3 (ref 0–0.9)
MONOCYTES NFR BLD AUTO: 6.3 % (ref 0–12)
NEUTROPHILS # BLD AUTO: 7.93 10*3/MM3 (ref 2–8.6)
NEUTROPHILS NFR BLD AUTO: 66.9 % (ref 37–80)
PLATELET # BLD AUTO: 244 10*3/MM3 (ref 150–450)
PMV BLD AUTO: 9.8 FL (ref 8–12)
POTASSIUM BLD-SCNC: 3.7 MMOL/L (ref 3.5–5.1)
PROT SERPL-MCNC: 6.9 G/DL (ref 6.3–8.6)
PROTHROMBIN TIME: 31 SECONDS (ref 11.1–15.3)
RBC # BLD AUTO: 4.95 10*6/MM3 (ref 3.77–5.16)
SODIUM BLD-SCNC: 143 MMOL/L (ref 137–145)
WBC NRBC COR # BLD: 11.84 10*3/MM3 (ref 3.2–9.8)
WHOLE BLOOD HOLD SPECIMEN: NORMAL
WHOLE BLOOD HOLD SPECIMEN: NORMAL

## 2018-03-15 PROCEDURE — 93005 ELECTROCARDIOGRAM TRACING: CPT | Performed by: EMERGENCY MEDICINE

## 2018-03-15 PROCEDURE — 80053 COMPREHEN METABOLIC PANEL: CPT | Performed by: EMERGENCY MEDICINE

## 2018-03-15 PROCEDURE — G0378 HOSPITAL OBSERVATION PER HR: HCPCS

## 2018-03-15 PROCEDURE — 99284 EMERGENCY DEPT VISIT MOD MDM: CPT

## 2018-03-15 PROCEDURE — 85610 PROTHROMBIN TIME: CPT | Performed by: EMERGENCY MEDICINE

## 2018-03-15 PROCEDURE — 85730 THROMBOPLASTIN TIME PARTIAL: CPT | Performed by: EMERGENCY MEDICINE

## 2018-03-15 PROCEDURE — 99219 PR INITIAL OBSERVATION CARE/DAY 50 MINUTES: CPT | Performed by: FAMILY MEDICINE

## 2018-03-15 PROCEDURE — 25010000002 DIPHENHYDRAMINE PER 50 MG: Performed by: EMERGENCY MEDICINE

## 2018-03-15 PROCEDURE — 25010000002 METHYLPREDNISOLONE PER 125 MG: Performed by: EMERGENCY MEDICINE

## 2018-03-15 PROCEDURE — 85025 COMPLETE CBC W/AUTO DIFF WBC: CPT | Performed by: EMERGENCY MEDICINE

## 2018-03-15 PROCEDURE — 96376 TX/PRO/DX INJ SAME DRUG ADON: CPT

## 2018-03-15 PROCEDURE — 96375 TX/PRO/DX INJ NEW DRUG ADDON: CPT

## 2018-03-15 PROCEDURE — 71045 X-RAY EXAM CHEST 1 VIEW: CPT

## 2018-03-15 PROCEDURE — 96374 THER/PROPH/DIAG INJ IV PUSH: CPT

## 2018-03-15 PROCEDURE — 93010 ELECTROCARDIOGRAM REPORT: CPT | Performed by: INTERNAL MEDICINE

## 2018-03-15 RX ORDER — LABETALOL HYDROCHLORIDE 5 MG/ML
10 INJECTION, SOLUTION INTRAVENOUS ONCE
Status: COMPLETED | OUTPATIENT
Start: 2018-03-15 | End: 2018-03-15

## 2018-03-15 RX ORDER — FAMOTIDINE 20 MG/1
20 TABLET, FILM COATED ORAL DAILY
Status: DISCONTINUED | OUTPATIENT
Start: 2018-03-16 | End: 2018-03-16 | Stop reason: HOSPADM

## 2018-03-15 RX ORDER — WARFARIN SODIUM 2.5 MG/1
2.5 TABLET ORAL
Status: DISCONTINUED | OUTPATIENT
Start: 2018-03-15 | End: 2018-03-16 | Stop reason: HOSPADM

## 2018-03-15 RX ORDER — DIPHENHYDRAMINE HYDROCHLORIDE 50 MG/ML
25 INJECTION INTRAMUSCULAR; INTRAVENOUS ONCE
Status: COMPLETED | OUTPATIENT
Start: 2018-03-15 | End: 2018-03-15

## 2018-03-15 RX ORDER — METHYLPREDNISOLONE SODIUM SUCCINATE 125 MG/2ML
60 INJECTION, POWDER, LYOPHILIZED, FOR SOLUTION INTRAMUSCULAR; INTRAVENOUS DAILY
Status: DISCONTINUED | OUTPATIENT
Start: 2018-03-16 | End: 2018-03-16 | Stop reason: HOSPADM

## 2018-03-15 RX ORDER — BISACODYL 10 MG
10 SUPPOSITORY, RECTAL RECTAL DAILY PRN
Status: DISCONTINUED | OUTPATIENT
Start: 2018-03-15 | End: 2018-03-16 | Stop reason: HOSPADM

## 2018-03-15 RX ORDER — ACETAMINOPHEN 325 MG/1
650 TABLET ORAL EVERY 4 HOURS PRN
Status: DISCONTINUED | OUTPATIENT
Start: 2018-03-15 | End: 2018-03-16 | Stop reason: HOSPADM

## 2018-03-15 RX ORDER — BISACODYL 5 MG/1
5 TABLET, DELAYED RELEASE ORAL DAILY PRN
Status: DISCONTINUED | OUTPATIENT
Start: 2018-03-15 | End: 2018-03-16 | Stop reason: HOSPADM

## 2018-03-15 RX ORDER — FAMOTIDINE 10 MG/ML
20 INJECTION, SOLUTION INTRAVENOUS EVERY 12 HOURS SCHEDULED
Status: DISCONTINUED | OUTPATIENT
Start: 2018-03-15 | End: 2018-03-16

## 2018-03-15 RX ORDER — METOPROLOL SUCCINATE 25 MG/1
25 TABLET, EXTENDED RELEASE ORAL DAILY
COMMUNITY
End: 2018-03-16 | Stop reason: HOSPADM

## 2018-03-15 RX ORDER — SODIUM CHLORIDE 0.9 % (FLUSH) 0.9 %
10 SYRINGE (ML) INJECTION AS NEEDED
Status: DISCONTINUED | OUTPATIENT
Start: 2018-03-15 | End: 2018-03-16 | Stop reason: HOSPADM

## 2018-03-15 RX ORDER — WARFARIN SODIUM 2.5 MG/1
2.5 TABLET ORAL
Status: DISCONTINUED | OUTPATIENT
Start: 2018-03-15 | End: 2018-03-15

## 2018-03-15 RX ORDER — FUROSEMIDE 20 MG/1
20 TABLET ORAL
Status: DISCONTINUED | OUTPATIENT
Start: 2018-03-15 | End: 2018-03-16 | Stop reason: HOSPADM

## 2018-03-15 RX ORDER — SODIUM CHLORIDE 0.9 % (FLUSH) 0.9 %
1-10 SYRINGE (ML) INJECTION AS NEEDED
Status: DISCONTINUED | OUTPATIENT
Start: 2018-03-15 | End: 2018-03-16 | Stop reason: HOSPADM

## 2018-03-15 RX ORDER — LEVOTHYROXINE SODIUM 88 UG/1
88 TABLET ORAL DAILY
Status: DISCONTINUED | OUTPATIENT
Start: 2018-03-15 | End: 2018-03-16 | Stop reason: HOSPADM

## 2018-03-15 RX ORDER — MELATONIN
1000 DAILY
COMMUNITY

## 2018-03-15 RX ORDER — WARFARIN SODIUM 5 MG/1
5 TABLET ORAL
Status: DISCONTINUED | OUTPATIENT
Start: 2018-03-16 | End: 2018-03-16 | Stop reason: HOSPADM

## 2018-03-15 RX ORDER — DIPHENHYDRAMINE HCL 25 MG
25 CAPSULE ORAL DAILY
Status: DISCONTINUED | OUTPATIENT
Start: 2018-03-16 | End: 2018-03-16 | Stop reason: HOSPADM

## 2018-03-15 RX ORDER — METHYLPREDNISOLONE SODIUM SUCCINATE 125 MG/2ML
125 INJECTION, POWDER, LYOPHILIZED, FOR SOLUTION INTRAMUSCULAR; INTRAVENOUS ONCE
Status: COMPLETED | OUTPATIENT
Start: 2018-03-15 | End: 2018-03-15

## 2018-03-15 RX ORDER — ONDANSETRON 2 MG/ML
4 INJECTION INTRAMUSCULAR; INTRAVENOUS EVERY 6 HOURS PRN
Status: DISCONTINUED | OUTPATIENT
Start: 2018-03-15 | End: 2018-03-16 | Stop reason: HOSPADM

## 2018-03-15 RX ADMIN — DIPHENHYDRAMINE HYDROCHLORIDE 25 MG: 50 INJECTION INTRAMUSCULAR; INTRAVENOUS at 10:15

## 2018-03-15 RX ADMIN — LEVOTHYROXINE SODIUM 88 MCG: 88 TABLET ORAL at 17:21

## 2018-03-15 RX ADMIN — METHYLPREDNISOLONE SODIUM SUCCINATE 125 MG: 125 INJECTION, POWDER, FOR SOLUTION INTRAMUSCULAR; INTRAVENOUS at 10:15

## 2018-03-15 RX ADMIN — FAMOTIDINE 20 MG: 10 INJECTION INTRAVENOUS at 21:14

## 2018-03-15 RX ADMIN — FUROSEMIDE 20 MG: 20 TABLET ORAL at 17:21

## 2018-03-15 RX ADMIN — LABETALOL HYDROCHLORIDE 10 MG: 5 INJECTION, SOLUTION INTRAVENOUS at 10:40

## 2018-03-15 RX ADMIN — WARFARIN SODIUM 2.5 MG: 2.5 TABLET ORAL at 17:21

## 2018-03-15 RX ADMIN — FAMOTIDINE 20 MG: 10 INJECTION INTRAVENOUS at 10:15

## 2018-03-15 RX ADMIN — METOPROLOL TARTRATE 25 MG: 25 TABLET ORAL at 21:14

## 2018-03-15 NOTE — PLAN OF CARE
Problem: Patient Care Overview  Goal: Plan of Care Review  Outcome: Ongoing (interventions implemented as appropriate)    Goal: Individualization and Mutuality  Outcome: Ongoing (interventions implemented as appropriate)    Goal: Discharge Needs Assessment  Outcome: Ongoing (interventions implemented as appropriate)    Goal: Interprofessional Rounds/Family Conf  Outcome: Ongoing (interventions implemented as appropriate)      Problem: Arrhythmia/Dysrhythmia (Symptomatic) (Adult)  Goal: Signs and Symptoms of Listed Potential Problems Will be Absent, Minimized or Managed (Arrhythmia/Dysrhythmia)  Outcome: Ongoing (interventions implemented as appropriate)

## 2018-03-15 NOTE — ED PROVIDER NOTES
Subjective   Patient is an 82-year-old female presents with facial swelling.  Started 2 nights ago.  She contacted her physician who asked her stop all her medicines except for Coumadin.  She was on the top all as well as lisinopril.  Doses of same earlier that day of the 13th.  She is taking by mouth Benadryl at home with only moderate result.  She says she going facial lip swelling but now she is having urticaria throughout her body.  She has no troubles breathing.  No trouble swallowing.  She has she says she has a little bit of a funny feeling in her chest.  Denies overt chest pain.  She does have a history of atrial fibrillation as well as mitral valve repair.            Review of Systems   Constitutional: Negative for activity change, appetite change, chills, diaphoresis, fatigue, fever and unexpected weight change.   Respiratory: Negative for apnea, cough, choking, chest tightness, shortness of breath, wheezing and stridor.    Cardiovascular: Positive for palpitations. Negative for chest pain and leg swelling.   Gastrointestinal: Negative for abdominal distention, abdominal pain, anal bleeding, blood in stool, constipation, diarrhea, nausea, rectal pain and vomiting.   All other systems reviewed and are negative.      Past Medical History:   Diagnosis Date   • Atrial fibrillation    • Dizziness and giddiness    • Edema    • Hyperlipidemia    • Hypertension    • Hypothyroidism    • Hypothyroidism    • Long term (current) use of anticoagulants    • Nonrheumatic mitral valve disorder, unspecified        Allergies   Allergen Reactions   • Codeine Sulfate        Past Surgical History:   Procedure Laterality Date   • APPENDECTOMY     • MITRAL VALVE REPLACEMENT         History reviewed. No pertinent family history.    Social History     Social History   • Marital status:      Social History Main Topics   • Smoking status: Never Smoker   • Smokeless tobacco: Never Used   • Alcohol use No   • Drug use: No   •  Sexual activity: Defer     Other Topics Concern   • Not on file           Objective   Physical Exam   Constitutional: She appears well-developed and well-nourished. No distress.   HENT:   Head: Normocephalic and atraumatic.   Mouth/Throat: Oropharynx is clear and moist.   Eyes: Conjunctivae and EOM are normal. Pupils are equal, round, and reactive to light. Right eye exhibits no discharge. Left eye exhibits no discharge. No scleral icterus.   Neck: Normal range of motion. Neck supple. No JVD present. No tracheal deviation present. No thyromegaly present.   Cardiovascular:   Regular irregular rhythm and tachycardic anywhere between 105 and 140.   Pulmonary/Chest: Effort normal and breath sounds normal. No respiratory distress. She has no wheezes. She has no rales. She exhibits no tenderness.   Abdominal: Soft. Bowel sounds are normal. She exhibits no distension and no mass. There is no tenderness. There is no rebound and no guarding. No hernia.   Musculoskeletal: She exhibits edema. She exhibits no tenderness or deformity.   Skin: She is not diaphoretic.   Moderately swollen lip and face ease.  She has no tongue swelling.  She has urticaria non-coalescing but many of them greater than 1 cm throughout her extremities and trunk.   Psychiatric: She has a normal mood and affect. Her behavior is normal. Judgment and thought content normal.   Nursing note and vitals reviewed.      ECG 12 Lead    Date/Time: 3/15/2018 9:33 AM  Performed by: BEAU PRICE  Authorized by: BEAU PRICE   Interpreted by physician  Comparison: compared with previous ECG   Similar to previous ECG  Rhythm: sinus rhythm  Rate: normal  BPM: 86  QRS axis: normal  Other findings: LVH  Clinical impression: abnormal ECG  Comments: A. fib with RVR.  She has some nonspecific ST-T wave changes.  No acute injury pattern noted.  She has a couple PVCs.                 ED Course  ED Course      Issue with urticaria.  I think her regular doctor probably  believe this was medication induced.  She has been off metoprolol and lisinopril for 2 days.  Unfortunately she is now has hypertensive urgency and atrial fibrillation with RVR.  Really can give her epinephrine to help with the urticaria so I have deferred that.  She received sign Medrol, Pepcid and Benadryl IV in the emergency department.    She still has some facial and lip swelling.  No tongue swelling.  She is also having low but a chest pressure.  I have asked Dr. Modi of the family medicine red [facility] to admit.            MDM  Number of Diagnoses or Management Options     Amount and/or Complexity of Data Reviewed  Clinical lab tests: ordered and reviewed  Tests in the radiology section of CPT®: ordered and reviewed  Tests in the medicine section of CPT®: reviewed and ordered  Review and summarize past medical records: yes  Independent visualization of images, tracings, or specimens: yes    Risk of Complications, Morbidity, and/or Mortality  Presenting problems: high  Diagnostic procedures: high  Management options: high        Final diagnoses:   Atrial fibrillation, persistent   Urticaria   Hypertensive urgency            Shakeel Raza MD  03/15/18 [id number]

## 2018-03-15 NOTE — ED TRIAGE NOTES
Pt now reports that the swelling has been going on since Tuesday. Pt was directed by PCP nurse to hold all medications except Coumadin.

## 2018-03-15 NOTE — PROGRESS NOTES
ADD: Pt instructed to go to walk-in 2 days ago, due to rash. Pt had stopped Toprol, instructed to hold all meds but coumadin.  Pt called this am rash worse, instructed to go to ER

## 2018-03-15 NOTE — H&P
HISTORY AND PHYSICAL  NAME: Miah Hogan  : 1935  MRN: [id number]    DATE OF ADMISSION: 03/15/18    DATE & TIME SEEN: 03/15/18 12:06 PM    PCP: Angela Urbano DO    CODE STATUS: Full Code    CHIEF COMPLAINT Rash and swelling of lips    HPI:  Miah Hogan is a 82 y.o. female who presents with rash and swelling of lips. Patient reports this began on 3/9 in the AM. Rash began first, starting on abdomen and buttock fold. Rash has since spread throughout her body. Patient called Dr. Adams's office about the issue on 3/9 and was told to stop all of her medications by his nurse. Swelling of lips began on 3/12 and has progressively gotten worse. Patient has stopped all medications except for coumadin. Despite stopping her medications she has progressively gotten worse in terms of rash that is itchy and swelling of lips. Patient received a steroid shot at Angela Urbano's office on 3/13 and states her symptoms improved until today where they worsened again.     Patient has not had any involvement of her tongue, no difficulty breathing, and no fevers reported. Rash is described as itchy and bumpy all over her body. Since receiving her Solumedrol, Pepcid, and benadryl here in the ER patient reports her rash has improved and itchiness has subsided some.    Cardiologist is Kevin which she sees for atrial fibrillation and required a mitral valve (mechanical) replacement in . Patient denies any shortness of breath or chest pain at this time. Because she has stopped taking her medications, her a-fib rate has increased to above 100.     CONCURRENT MEDICAL HISTORY:  Past Medical History:   Diagnosis Date   • Atrial fibrillation    • Dizziness and giddiness    • Edema    • Hyperlipidemia    • Hypertension    • Hypothyroidism    • Hypothyroidism    • Long term (current) use of anticoagulants    • Nonrheumatic mitral valve disorder, unspecified        PAST SURGICAL HISTORY:  Past Surgical History:   Procedure  Laterality Date   • APPENDECTOMY     • MITRAL VALVE REPLACEMENT         FAMILY HISTORY:  History reviewed. No pertinent family history.     SOCIAL HISTORY:  Social History     Social History   • Marital status:      Spouse name: N/A   • Number of children: N/A   • Years of education: N/A     Occupational History   • Not on file.     Social History Main Topics   • Smoking status: Never Smoker   • Smokeless tobacco: Never Used   • Alcohol use No   • Drug use: No   • Sexual activity: Defer     Other Topics Concern   • Not on file     Social History Narrative   • No narrative on file       HOME MEDICATIONS:    Current Facility-Administered Medications:   •  famotidine (PEPCID) injection 20 mg, 20 mg, Intravenous, Q12H, Shakeel Raza MD, 20 mg at 03/15/18 1015  •  Insert peripheral IV, , , Once **AND** sodium chloride 0.9 % flush 10 mL, 10 mL, Intravenous, PRN, Shakeel Raza MD    Current Outpatient Prescriptions:   •  furosemide (LASIX) 20 MG tablet, Take 20 mg by mouth 2 (Two) Times a Day., Disp: , Rfl:   •  levothyroxine (SYNTHROID, LEVOTHROID) 88 MCG tablet, Take 1 tablet by mouth Daily., Disp: , Rfl: 6  •  metoprolol succinate XL (TOPROL-XL) 25 MG 24 hr tablet, Take 1 tablet by mouth Daily., Disp: 30 tablet, Rfl: 6  •  warfarin (COUMADIN) 2.5 MG tablet, Take 1 tablet by mouth Daily., Disp: 30 tablet, Rfl: 6    ALLERGIES:  Codeine sulfate and Lisinopril    REVIEW OF SYSTEMS  Review of Systems  General:negative for - chills, fatigue, fever, hot flashes, malaise, night sweats, weight gain or weight loss  Psychological: negative for - anxiety, depression, sleep disturbances or suicidal ideation  Ophthalmic: negative for - blurry vision or loss of vision  ENT: negative for - hearing change, nasal congestion or sore throat  Hematological and Lymphatic: negative for - jaundice  Endocrine: negative for - hair pattern changes, skin changes or temperature intolerance  Respiratory: no cough, shortness of breath, or  wheezing  Cardiovascular: no chest pain, edema or dyspnea on exertion  Gastrointestinal: no  Nausea/vomiting, abdominal pain, change in bowel habits, or black or bloody stools  Genito-Urinary: no dysuria, trouble voiding, or hematuria  Musculoskeletal: negative for - joint pain or muscle pain  Neurological: negative for - dizziness, headaches, numbness/tingling or seizures  Dermatological: Itchy rash throughout body. Swelling of lips.    PHYSICAL EXAM:  Temp:  [98 °F (36.7 °C)] 98 °F (36.7 °C)  Heart Rate:  [108-137] 128  Resp:  [18-20] 18  BP: (139-168)/() 154/94  Body mass index is 34.54 kg/m².  Physical Exam  General Appearance:    Alert, cooperative, no distress, appears stated age   Head:    Normocephalic, without obvious abnormality, atraumatic   Eyes:    PERRL, conjunctiva/corneas clear, EOM's intact   Ears:    Normal TM's and external ear canals, both ears   Nose:   Nares normal, septum midline, mucosa normal, no drainage     or sinus tenderness   Throat:   Mucosa, and tongue normal; teeth and gums normal. Lips swollen.   Neck:   Supple, symmetrical, trachea midline, no adenopathy   Back:     Symmetric, no curvature, ROM normal   Lungs:     Clear to auscultation bilaterally, respirations unlabored   Chest Wall:    No tenderness or deformity    Heart:    Irregularly irregular, S1 and S2 normal, no murmur, rub    or gallop   Abdomen:     Soft, non-tender, bowel sounds active all four quadrants,     no masses, no organomegaly   Extremities:   Extremities normal, atraumatic, no cyanosis. 1+ left LE pitting edema.   Pulses:   2+ and symmetric all extremities   Skin:   Urticaria scalp, right shoulder, left abdomen, and groin.   Lymph nodes:   Cervical, supraclavicular nodes normal   Neurologic:   CNII-XII grossly intact       DIAGNOSTIC DATA:   Lab Results (last 24 hours)     Procedure Component Value Units Date/Time    Comprehensive Metabolic Panel [[id number]]  (Abnormal) Collected:  03/15/18 1024     Specimen:  Blood Updated:  03/15/18 1108     Glucose 98 mg/dL      BUN 29 (H) mg/dL      Creatinine 0.86 mg/dL      Sodium 143 mmol/L      Potassium 3.7 mmol/L      Chloride 105 mmol/L      CO2 25.0 mmol/L      Calcium 9.2 mg/dL      Total Protein 6.9 g/dL      Albumin 3.70 g/dL      ALT (SGPT) 34 U/L      AST (SGOT) 28 U/L      Alkaline Phosphatase 65 U/L      Total Bilirubin 1.0 mg/dL      eGFR Non African Amer 63 mL/min/1.73      Globulin 3.2 gm/dL      A/G Ratio 1.2 g/dL      BUN/Creatinine Ratio 33.7 (H)     Anion Gap 13.0 mmol/L     Narrative:       The MDRD GFR formula is only valid for adults with stable renal function between ages 18 and 70.    [place] Draw [[id number]] Collected:  03/15/18 0936    Specimen:  Blood Updated:  03/15/18 1046    Narrative:       The following orders were created for panel order [place] Draw.  Procedure                               Abnormality         Status                     ---------                               -----------         ------                     Light Blue Top[[id number]]                                   Final result               Green Top (Gel)[[id number]]                                  Final result               Lavender Top[[id number]]                                     Final result               Gold Top - SST[[id number]]                                   Final result                 Please view results for these tests on the individual orders.    Light Blue Top [[id number]] Collected:  03/15/18 0936    Specimen:  Blood Updated:  03/15/18 1046     Extra Tube hold for add-on     Comment: Auto resulted       Green Top (Gel) [[id number]] Collected:  03/15/18 0936    Specimen:  Blood Updated:  03/15/18 1046     Extra Tube Hold for add-ons.     Comment: Auto resulted.       Lavender Top [[id number]] Collected:  03/15/18 0936    Specimen:  Blood Updated:  03/15/18 1046     Extra Tube hold for add-on     Comment: Auto resulted       Gold Top - SST [[id number]]  Collected:  03/15/18 0936    Specimen:  Blood Updated:  03/15/18 1046     Extra Tube Hold for add-ons.     Comment: Auto resulted.       Protime-INR [[id number]]  (Abnormal) Collected:  03/15/18 0936    Specimen:  Blood Updated:  03/15/18 1022     Protime 31.0 (H) Seconds      INR 3.18 (H)    Narrative:       Therapeutic range for most indications is 2.0-3.0 INR,  or 2.5-3.5 for mechanical heart valves.    aPTT [[id number]]  (Abnormal) Collected:  03/15/18 0936    Specimen:  Blood Updated:  03/15/18 1022     PTT 58.2 (H) seconds     Narrative:       The recommended Heparin therapeutic range is 68-97 seconds.    CBC & Differential [[id number]] Collected:  03/15/18 0936    Specimen:  Blood Updated:  03/15/18 0946    Narrative:       The following orders were created for panel order CBC & Differential.  Procedure                               Abnormality         Status                     ---------                               -----------         ------                     CBC Auto Differential[[id number]]        Abnormal            Final result                 Please view results for these tests on the individual orders.    CBC Auto Differential [[id number]]  (Abnormal) Collected:  03/15/18 0936    Specimen:  Blood Updated:  03/15/18 0946     WBC 11.84 (H) 10*3/mm3      RBC 4.95 10*6/mm3      Hemoglobin 15.4 g/dL      Hematocrit 45.8 (H) %      MCV 92.5 fL      MCH 31.1 pg      MCHC 33.6 g/dL      RDW 14.4 %      RDW-SD 48.6 (H) fl      MPV 9.8 fL      Platelets 244 10*3/mm3      Neutrophil % 66.9 %      Lymphocyte % 25.8 %      Monocyte % 6.3 %      Eosinophil % 0.6 %      Basophil % 0.1 %      Immature Grans % 0.3 %      Neutrophils, Absolute 7.93 10*3/mm3      Lymphocytes, Absolute 3.05 10*3/mm3      Monocytes, Absolute 0.74 10*3/mm3      Eosinophils, Absolute 0.07 10*3/mm3      Basophils, Absolute 0.01 10*3/mm3      Immature Grans, Absolute 0.04 (H) 10*3/mm3            Imaging Results (last 24 hours)     Procedure  Component Value Units Date/Time    XR Chest 1 View [[id number]] Collected:  03/15/18 0945     Updated:  03/15/18 1033    Narrative:       Chest single view.    CLINICAL INDICATION: Shortness of breath. Chest pain    COMPARISON: October 28, 2013.    FINDINGS: Cardiomegaly. Midline sternal sutures from prior  cardiac surgery. Lung fields clear. No active infiltrates,  masses, or pulmonary vascular engorgement.    No changes since prior exam.      Impression:       CONCLUSION: No evidence of active disease.    Electronically signed by:  Drew Romo MD  3/15/2018 10:32 AM CDT  Workstation: [id number]          I reviewed the patient's new clinical results.    ASSESSMENT AND PLAN: This is a 82 y.o. female with:    Active Hospital Problems (** Indicates Principal Problem)    Diagnosis Date Noted   • **Angioedema [T78.3XXA] 03/15/2018     -will monitor for any worsening, patient s/p Solumedrol 125 mg in ED, Benadryl 25 mg IV, and Pepcid 20 mg IV  -patient has improved since administration of medication, will continue PO benadryl and Pepcid with solumedrol 60 mg IV daily  -will discontinue lisinopril indefinitely, will attempt to restart other medications.     • Atrial fibrillation, persistent [I48.1] 03/15/2018     -patient has decreased in rate since ED arrival from rate of 140s to 110s after labetolol 10 mg IV push  -patient has been off of amiodarone for unknown amount of days since 3/9 as she was told to stop one medication at a time    -discussed case with Dr. Adams who states that with her current rate of a-fib to hold off on a drip and simply place her on metoprolol tartrate 25 mg BID instead of her home Toprol XL and monitor rate that way.       • Essential hypertension [I10] 01/27/2017     -will stop lisinopril  -continue home lasix and home metoprolol     • S/P MVR (mitral valve repair) [Z98.890] 01/27/2017     -will continue patient on warfarin, pharmacy to dose, INR between 2.5-3.5     • Hypothyroidism [E03.9]       -continue home levothyroxine  -will order TSH and fT4        Resolved Hospital Problems    Diagnosis Date Noted Date Resolved   No resolved problems to display.       DVT prophylaxis: Warfarin  Code status is Full Code  [facility] # [id number], reviewed and consistent with patient reported medications.    Expected Length of Stay: 2-3 days    I discussed the patients findings and my recommendations with patient.     Dr. Mcpherson is the attending on record at time of admission, he is aware of the patient's status and agrees with the above history and physical.          This document has been electronically signed by Ortega Parra MD on March 15, 2018 1:37 PM

## 2018-03-15 NOTE — PROGRESS NOTES
"Anticoagulation by Pharmacy - Warfarin    Miah Hogan is a 82 y.o.female  [Ht: 160 cm (63\"); Wt: 88.5 kg (195 lb)] on Warfarin 2.5 mg PO  for indication of MVR / a-fib.    Goal INR: 2.5-3.5  Today's INR:   Lab Results   Component Value Date    INR 3.18 (H) 03/15/2018         Lab Results   Component Value Date    INR 3.18 (H) 03/15/2018    INR 3.10 (A) 03/13/2018    INR 1.60 (A) 02/27/2018    PROTIME 31.0 (H) 03/15/2018     Lab Results   Component Value Date    HGB 15.4 03/15/2018    HGB 13.8 01/27/2017    HGB 12.9 09/01/2015     Lab Results   Component Value Date    HCT 45.8 (H) 03/15/2018    HCT 42.0 01/27/2017    HCT 41.1 09/01/2015     Lab Results   Component Value Date     03/15/2018     01/27/2017     09/01/2015     Assessment/Plan:  INR 3.18, therapeutic on home dose.  PTA: 2.5mg daily except for 5mg on Tues/Fridays  Continue PTA regimen. No major interacting meds.    Bill Morales RPH  03/15/18 4:05 PM     "

## 2018-03-15 NOTE — H&P
Angioedema    Date of Admission: 3/15/2018  Subjective:     Miah Hogan is a 82 y.o. female who presents for lip swelling and rash on abdomen. Patient states that her lips started to swell about 2-3 days ago and has remained unchanged since then. She states that she denies any breathing issues. No swelling of the tongue present. Able to talk in complete sentences. She states a similar incident occurred previously when she was amiodarone and it resolved after stopping that medication. In addition, patient noticed a rash that started on her abdomen and moved to her buttock areas. This started approximately 1 week ago. Since then, the rash had started to become more widespread. When the rash first started, she called her cardiologist's office and was recommended to hold her medications. When her lips started to swell, she mentioned the rash started to become more itchy. She had received a steroid shot by her PCP, however symptoms continued to persist.     The following portions of the patient's history were reviewed and updated as appropriate: allergies, current medications, past family history, past medical history, past social history, past surgical history and problem list.    Concurrent Medical Hx:  Past Medical History:   Diagnosis Date   • Atrial fibrillation    • Dizziness and giddiness    • Edema    • Hyperlipidemia    • Hypertension    • Hypothyroidism    • Hypothyroidism    • Long term (current) use of anticoagulants    • Nonrheumatic mitral valve disorder, unspecified        Past Surgical Hx:  Past Surgical History:   Procedure Laterality Date   • APPENDECTOMY     • MITRAL VALVE REPLACEMENT       Family Hx:  History reviewed. No pertinent family history.   Social History:  Social History     Social History   • Marital status:      Spouse name: N/A   • Number of children: N/A   • Years of education: N/A     Occupational History   • Not on file.     Social History Main Topics   • Smoking status:  Never Smoker   • Smokeless tobacco: Never Used   • Alcohol use No   • Drug use: No   • Sexual activity: Defer     Other Topics Concern   • Not on file     Social History Narrative   • No narrative on file       Home Medications:  No current facility-administered medications on file prior to encounter.      Current Outpatient Prescriptions on File Prior to Encounter   Medication Sig Dispense Refill   • levothyroxine (SYNTHROID, LEVOTHROID) 88 MCG tablet Take 1 tablet by mouth Daily.  6   • warfarin (COUMADIN) 2.5 MG tablet Take 1 tablet by mouth Daily. (Patient taking differently: Take 2.5 mg by mouth Daily. Take 2.5 mg by mouth on SuMoWeThSa and 5 mg by mouth on TuFr) 30 tablet 6   • [DISCONTINUED] Cholecalciferol (VITAMIN D3) 2000 UNITS tablet Take  by mouth.     • [DISCONTINUED] furosemide (LASIX) 20 MG tablet Take 20 mg by mouth 2 (Two) Times a Day.     • [DISCONTINUED] lisinopril (PRINIVIL,ZESTRIL) 10 MG tablet Take 10 mg by mouth Daily.     • [DISCONTINUED] metoprolol succinate XL (TOPROL-XL) 25 MG 24 hr tablet Take 1 tablet by mouth Daily. 30 tablet 6       Allergies:  Codeine sulfate and Lisinopril  I reviewed the patient's new clinical results.  I reviewed the patient's other test results and agree with the interpretation  Review of Systems  Review of Systems   Constitutional: Negative for appetite change, chills and fever.   HENT: Positive for facial swelling. Negative for congestion, ear pain, rhinorrhea, sneezing, sore throat, trouble swallowing and voice change.    Respiratory: Negative for cough and shortness of breath.    Cardiovascular: Negative for chest pain, palpitations and leg swelling.   Gastrointestinal: Negative for diarrhea, nausea and vomiting.   Endocrine: Negative for polyphagia and polyuria.   Musculoskeletal: Negative for back pain and neck pain.   Skin: Positive for rash. Negative for color change.   Neurological: Negative for dizziness, syncope and weakness.   Psychiatric/Behavioral:  "Negative for agitation, confusion and dysphoric mood.       Objective:     /85 (BP Location: Right arm, Patient Position: Lying)   Pulse 103   Temp 98 °F (36.7 °C) (Oral)   Resp 18   Ht 160 cm (63\")   Wt 88.5 kg (195 lb)   SpO2 94%   BMI 34.54 kg/m²   Physical Exam   Constitutional: She is oriented to person, place, and time. She appears well-developed and well-nourished.   HENT:   Swollen lips present    Cardiovascular: Normal heart sounds.  Exam reveals no gallop and no friction rub.    No murmur heard.  Tachycardic, irregularly irregular    Pulmonary/Chest: Effort normal and breath sounds normal. No respiratory distress. She has no wheezes. She has no rales.   Abdominal: Soft. Bowel sounds are normal. There is no tenderness.   Musculoskeletal: Normal range of motion. She exhibits no edema.   Neurological: She is alert and oriented to person, place, and time.   Skin: Skin is warm and dry.   Urticaria present on right shoulder and left portion of abdomen   Psychiatric: She has a normal mood and affect. Her behavior is normal.   Vitals reviewed.    I reviewed the patient's new clinical results.  I reviewed the patient's other test results.  Assessment/Plan:     Active Hospital Problems (** Indicates Principal Problem)    Diagnosis Date Noted   • **Angioedema [T78.3XXA] 03/15/2018     -will monitor for any worsening, patient s/p Solumedrol 125 mg in ED, Benadryl 25 mg IV, and Pepcid 20 mg IV  -patient has improved since administration of medication, will continue PO benadryl and Pepcid with solumedrol 60 mg IV daily  -will discontinue lisinopril indefinitely, will attempt to restart other medications.     • Atrial fibrillation, persistent [I48.1] 03/15/2018     -patient has decreased in rate since ED arrival from rate of 140s to 110s after labetolol 10 mg IV push  -patient has been off of amiodarone for unknown amount of days since 3/9 as she was told to stop one medication at a time    -discussed case " with Dr. Adams who states that with her current rate of a-fib to hold off on a drip and simply place her on metoprolol tartrate 25 mg BID instead of her home Toprol XL and monitor rate that way.       • Essential hypertension [I10] 01/27/2017     -will stop lisinopril  -continue home lasix and home metoprolol     • S/P MVR (mitral valve repair) [Z98.890] 01/27/2017     -will continue patient on warfarin, pharmacy to dose, INR between 2.5-3.5     • Hypothyroidism [E03.9]      -continue home levothyroxine  -will order TSH and fT4        Resolved Hospital Problems    Diagnosis Date Noted Date Resolved   No resolved problems to display.         I have seen and examined the patient.  I have reviewed the notes, assessments, and/or procedures performed by Dr. Rocha, I concur with her/his documentation and assessment and plan for Miah Hogan.      This document has been electronically signed by Aleksandr Mcpherson MD on March 15, 2018 4:54 PM

## 2018-03-16 ENCOUNTER — DOCUMENTATION (OUTPATIENT)
Dept: CARDIAC SURGERY | Facility: CLINIC | Age: 83
End: 2018-03-16

## 2018-03-16 VITALS
BODY MASS INDEX: 33.66 KG/M2 | HEIGHT: 63 IN | WEIGHT: 190 LBS | DIASTOLIC BLOOD PRESSURE: 99 MMHG | OXYGEN SATURATION: 95 % | RESPIRATION RATE: 18 BRPM | HEART RATE: 87 BPM | TEMPERATURE: 97.2 F | SYSTOLIC BLOOD PRESSURE: 129 MMHG

## 2018-03-16 PROBLEM — T78.3XXA ANGIOEDEMA: Status: RESOLVED | Noted: 2018-03-15 | Resolved: 2018-03-16

## 2018-03-16 LAB
ALBUMIN SERPL-MCNC: 3.5 G/DL (ref 3.4–4.8)
ALBUMIN/GLOB SERPL: 1.1 G/DL (ref 1.1–1.8)
ALP SERPL-CCNC: 61 U/L (ref 38–126)
ALT SERPL W P-5'-P-CCNC: 31 U/L (ref 9–52)
ANION GAP SERPL CALCULATED.3IONS-SCNC: 8 MMOL/L (ref 5–15)
AST SERPL-CCNC: 21 U/L (ref 14–36)
BASOPHILS # BLD AUTO: 0.01 10*3/MM3 (ref 0–0.2)
BASOPHILS NFR BLD AUTO: 0.1 % (ref 0–2)
BILIRUB SERPL-MCNC: 0.6 MG/DL (ref 0.2–1.3)
BUN BLD-MCNC: 23 MG/DL (ref 7–21)
BUN/CREAT SERPL: 29.5 (ref 7–25)
CALCIUM SPEC-SCNC: 9.3 MG/DL (ref 8.4–10.2)
CHLORIDE SERPL-SCNC: 102 MMOL/L (ref 95–110)
CO2 SERPL-SCNC: 28 MMOL/L (ref 22–31)
CREAT BLD-MCNC: 0.78 MG/DL (ref 0.5–1)
DEPRECATED RDW RBC AUTO: 47.8 FL (ref 36.4–46.3)
EOSINOPHIL # BLD AUTO: 0 10*3/MM3 (ref 0–0.7)
EOSINOPHIL NFR BLD AUTO: 0 % (ref 0–7)
ERYTHROCYTE [DISTWIDTH] IN BLOOD BY AUTOMATED COUNT: 14.2 % (ref 11.5–14.5)
GFR SERPL CREATININE-BSD FRML MDRD: 71 ML/MIN/1.73 (ref 60–90)
GLOBULIN UR ELPH-MCNC: 3.3 GM/DL (ref 2.3–3.5)
GLUCOSE BLD-MCNC: 113 MG/DL (ref 60–100)
HCT VFR BLD AUTO: 41.1 % (ref 35–45)
HGB BLD-MCNC: 13.8 G/DL (ref 12–15.5)
IMM GRANULOCYTES # BLD: 0.03 10*3/MM3 (ref 0–0.02)
IMM GRANULOCYTES NFR BLD: 0.3 % (ref 0–0.5)
INR PPP: 3.42 (ref 0.8–1.2)
LYMPHOCYTES # BLD AUTO: 1.56 10*3/MM3 (ref 0.6–4.2)
LYMPHOCYTES NFR BLD AUTO: 15.6 % (ref 10–50)
MCH RBC QN AUTO: 30.8 PG (ref 26.5–34)
MCHC RBC AUTO-ENTMCNC: 33.6 G/DL (ref 31.4–36)
MCV RBC AUTO: 91.7 FL (ref 80–98)
MONOCYTES # BLD AUTO: 0.51 10*3/MM3 (ref 0–0.9)
MONOCYTES NFR BLD AUTO: 5.1 % (ref 0–12)
NEUTROPHILS # BLD AUTO: 7.91 10*3/MM3 (ref 2–8.6)
NEUTROPHILS NFR BLD AUTO: 78.9 % (ref 37–80)
PLATELET # BLD AUTO: 240 10*3/MM3 (ref 150–450)
PMV BLD AUTO: 9.7 FL (ref 8–12)
POTASSIUM BLD-SCNC: 4.5 MMOL/L (ref 3.5–5.1)
PROT SERPL-MCNC: 6.8 G/DL (ref 6.3–8.6)
PROTHROMBIN TIME: 32.8 SECONDS (ref 11.1–15.3)
RBC # BLD AUTO: 4.48 10*6/MM3 (ref 3.77–5.16)
SODIUM BLD-SCNC: 138 MMOL/L (ref 137–145)
T4 FREE SERPL-MCNC: 1.31 NG/DL (ref 0.78–2.19)
TSH SERPL DL<=0.05 MIU/L-ACNC: 0.08 MIU/ML (ref 0.46–4.68)
WBC NRBC COR # BLD: 10.02 10*3/MM3 (ref 3.2–9.8)

## 2018-03-16 PROCEDURE — 96376 TX/PRO/DX INJ SAME DRUG ADON: CPT

## 2018-03-16 PROCEDURE — 84443 ASSAY THYROID STIM HORMONE: CPT | Performed by: STUDENT IN AN ORGANIZED HEALTH CARE EDUCATION/TRAINING PROGRAM

## 2018-03-16 PROCEDURE — 25010000002 METHYLPREDNISOLONE PER 125 MG: Performed by: FAMILY MEDICINE

## 2018-03-16 PROCEDURE — 84439 ASSAY OF FREE THYROXINE: CPT | Performed by: STUDENT IN AN ORGANIZED HEALTH CARE EDUCATION/TRAINING PROGRAM

## 2018-03-16 PROCEDURE — 63710000001 DIPHENHYDRAMINE PER 50 MG: Performed by: FAMILY MEDICINE

## 2018-03-16 PROCEDURE — 85610 PROTHROMBIN TIME: CPT | Performed by: FAMILY MEDICINE

## 2018-03-16 PROCEDURE — 80053 COMPREHEN METABOLIC PANEL: CPT | Performed by: FAMILY MEDICINE

## 2018-03-16 PROCEDURE — 85025 COMPLETE CBC W/AUTO DIFF WBC: CPT | Performed by: FAMILY MEDICINE

## 2018-03-16 PROCEDURE — G0378 HOSPITAL OBSERVATION PER HR: HCPCS

## 2018-03-16 RX ORDER — DIPHENHYDRAMINE HCL 25 MG
25 CAPSULE ORAL DAILY
Qty: 14 CAPSULE | Refills: 0 | Status: SHIPPED | OUTPATIENT
Start: 2018-03-17 | End: 2018-03-23

## 2018-03-16 RX ORDER — PREDNISONE 20 MG/1
40 TABLET ORAL DAILY
Qty: 6 TABLET | Refills: 0 | Status: SHIPPED | OUTPATIENT
Start: 2018-03-17 | End: 2018-03-16

## 2018-03-16 RX ORDER — FAMOTIDINE 20 MG/1
20 TABLET, FILM COATED ORAL DAILY
Qty: 14 TABLET | Refills: 0 | Status: SHIPPED | OUTPATIENT
Start: 2018-03-17 | End: 2018-03-16

## 2018-03-16 RX ORDER — FAMOTIDINE 20 MG/1
20 TABLET, FILM COATED ORAL DAILY
Qty: 14 TABLET | Refills: 0 | Status: SHIPPED | OUTPATIENT
Start: 2018-03-17 | End: 2018-09-19

## 2018-03-16 RX ORDER — DIPHENHYDRAMINE HCL 25 MG
25 CAPSULE ORAL DAILY
Qty: 14 CAPSULE | Refills: 0 | Status: SHIPPED | OUTPATIENT
Start: 2018-03-17 | End: 2018-03-16

## 2018-03-16 RX ORDER — PREDNISONE 20 MG/1
40 TABLET ORAL DAILY
Qty: 6 TABLET | Refills: 0 | Status: SHIPPED | OUTPATIENT
Start: 2018-03-17 | End: 2018-03-20

## 2018-03-16 RX ADMIN — METOPROLOL TARTRATE 25 MG: 25 TABLET ORAL at 08:22

## 2018-03-16 RX ADMIN — DIPHENHYDRAMINE HYDROCHLORIDE 25 MG: 25 CAPSULE ORAL at 05:56

## 2018-03-16 RX ADMIN — FAMOTIDINE 20 MG: 20 TABLET, FILM COATED ORAL at 05:56

## 2018-03-16 RX ADMIN — METHYLPREDNISOLONE SODIUM SUCCINATE 60 MG: 125 INJECTION, POWDER, FOR SOLUTION INTRAMUSCULAR; INTRAVENOUS at 08:22

## 2018-03-16 RX ADMIN — LEVOTHYROXINE SODIUM 88 MCG: 88 TABLET ORAL at 08:22

## 2018-03-16 RX ADMIN — FUROSEMIDE 20 MG: 20 TABLET ORAL at 08:22

## 2018-03-16 NOTE — PROGRESS NOTES
FAMILY MEDICINE DAILY PROGRESS NOTE    NAME: Miah Hogan  : 1935  MRN: [id number]      LOS: 0 days     PROVIDER OF SERVICE: Lars Almaguer Jr, MD    Chief Complaint: Angioedema    Subjective:     Interval History:  History taken from: patient chart  No acute overnight.  Patient states she feels better than she has in some time.  Her rash involving the neck, scalp, abdomen, & groin has completely resolved.  Patient's home medication regimen was restarted with exception of lisinopril.      Review of Systems:   Review of Systems  General:negative for - chills, fatigue, fever, hot flashes, malaise, night sweats, weight gain or weight loss  Psychological: negative for - anxiety, depression, sleep disturbances or suicidal ideation  Ophthalmic: negative for - blurry vision or loss of vision  ENT: negative for - hearing change, nasal congestion or sore throat  Hematological and Lymphatic: negative for - jaundice  Endocrine: negative for - hair pattern changes, skin changes or temperature intolerance  Respiratory: no cough, shortness of breath, or wheezing  Cardiovascular: no chest pain, edema or dyspnea on exertion  Gastrointestinal: no  Nausea/vomiting, abdominal pain, change in bowel habits, or black or bloody stools  Genito-Urinary: no dysuria, trouble voiding, or hematuria  Musculoskeletal: negative for - joint pain or muscle pain  Neurological: negative for - dizziness, headaches, numbness/tingling or seizures  Dermatological: pruritic rash present on admission has resolved      Objective:     Vital Signs  Temp:  [97.2 °F (36.2 °C)-98 °F (36.7 °C)] 98 °F (36.7 °C)  Heart Rate:  [] 76  Resp:  [18-20] 18  BP: (127-168)/() 142/75  Body mass index is 33.66 kg/m².    Physical Exam  Physical Exam   General Appearance:    Alert, cooperative, no distress, appears stated age   Head:    Normocephalic, without obvious abnormality, atraumatic   Eyes:    PERRL, conjunctiva/corneas clear, EOM's intact    Ears:    Normal TM's and external ear canals, both ears   Nose:   Nares normal, septum midline, mucosa normal, no drainage     or sinus tenderness   Throat:   Lips normal (improvement since admission), mucosa, and tongue normal; teeth and gums normal   Neck:   Supple, symmetrical, trachea midline, no adenopathy   Back:     Symmetric, no curvature, ROM normal   Lungs:     Clear to auscultation bilaterally, respirations unlabored   Chest Wall:    No tenderness or deformity    Heart:    Regular rate and rhythm, S1 and S2 normal, no murmur, rub    or gallop   Abdomen:     Soft, non-tender, bowel sounds active all four quadrants,     no masses, no organomegaly   Extremities:   1+ pitting edema of the lower extremities bilaterally,; Extremities normal, atraumatic, no cyanosis or edema   Pulses:   2+ and symmetric all extremities   Skin:   Rash on scalp, shoulder, abdomen, & groin has resolved as of 3/16; Skin color, texture, turgor normal, no rashes or lesions   Lymph nodes:   Cervical, supraclavicular nodes normal   Neurologic:   CNII-XII grossly intact     Medication Review    Current Facility-Administered Medications:   •  acetaminophen (TYLENOL) tablet 650 mg, 650 mg, Oral, Q4H PRN, Ortega Munoz MD  •  bisacodyl (DULCOLAX) EC tablet 5 mg, 5 mg, Oral, Daily PRN, Ortega Munoz MD  •  bisacodyl (DULCOLAX) suppository 10 mg, 10 mg, Rectal, Daily PRN, Ortega Munoz MD  •  diphenhydrAMINE (BENADRYL) capsule 25 mg, 25 mg, Oral, Daily, Ortega Munoz MD, 25 mg at 03/16/18 0556  •  famotidine (PEPCID) injection 20 mg, 20 mg, Intravenous, Q12H, Shakeel Raza MD, 20 mg at 03/15/18 2114  •  famotidine (PEPCID) tablet 20 mg, 20 mg, Oral, Daily, Ortega Munoz MD, 20 mg at 03/16/18 0556  •  furosemide (LASIX) tablet 20 mg, 20 mg, Oral, BID, Ortega Munoz MD, 20 mg at 03/15/18 1721  •  levothyroxine (SYNTHROID, LEVOTHROID) tablet 88 mcg, 88 mcg, Oral, Daily, Ortega Munoz MD, 88 mcg at 03/15/18 1721  •  methylPREDNISolone  sodium succinate (SOLU-Medrol) injection 60 mg, 60 mg, Intravenous, Daily, Ortega Munoz MD  •  metoprolol tartrate (LOPRESSOR) tablet 25 mg, 25 mg, Oral, Q12H, Ortega Munoz MD, 25 mg at 03/15/18 2114  •  ondansetron (ZOFRAN) injection 4 mg, 4 mg, Intravenous, Q6H PRN, Ortega Munoz MD  •  Pharmacy to dose warfarin, , Does not apply, Continuous PRN, Ortega Munoz MD  •  sodium chloride 0.9 % flush 1-10 mL, 1-10 mL, Intravenous, PRN, Ortega Munoz MD  •  Insert peripheral IV, , , Once **AND** sodium chloride 0.9 % flush 10 mL, 10 mL, Intravenous, PRN, Shakeel Raza MD  •  warfarin (COUMADIN) tablet 2.5 mg, 2.5 mg, Oral, Once per day on Sun Mon Wed Thu Sat, Ortega Munoz MD, 2.5 mg at 03/15/18 1721  •  warfarin (COUMADIN) tablet 5 mg, 5 mg, Oral, Once per day on Tue Fri, Ortega Munoz MD     Diagnostic Data    Lab Results (last 24 hours)     Procedure Component Value Units Date/Time    Protime-INR [[id number]]  (Abnormal) Collected:  03/16/18 0549    Specimen:  Blood Updated:  03/16/18 0647     Protime 32.8 (H) Seconds      INR 3.42 (H)    Narrative:       Therapeutic range for most indications is 2.0-3.0 INR,  or 2.5-3.5 for mechanical heart valves.    Comprehensive Metabolic Panel [[id number]]  (Abnormal) Collected:  03/16/18 0549    Specimen:  Blood Updated:  03/16/18 0640     Glucose 113 (H) mg/dL      BUN 23 (H) mg/dL      Creatinine 0.78 mg/dL      Sodium 138 mmol/L      Potassium 4.5 mmol/L      Chloride 102 mmol/L      CO2 28.0 mmol/L      Calcium 9.3 mg/dL      Total Protein 6.8 g/dL      Albumin 3.50 g/dL      ALT (SGPT) 31 U/L      AST (SGOT) 21 U/L      Alkaline Phosphatase 61 U/L      Total Bilirubin 0.6 mg/dL      eGFR Non African Amer 71 mL/min/1.73      Globulin 3.3 gm/dL      A/G Ratio 1.1 g/dL      BUN/Creatinine Ratio 29.5 (H)     Anion Gap 8.0 mmol/L     Narrative:       The MDRD GFR formula is only valid for adults with stable renal function between ages 18 and 70.    CBC & Differential  [[id number]] Collected:  03/16/18 0549    Specimen:  Blood Updated:  03/16/18 0621    Narrative:       The following orders were created for panel order CBC & Differential.  Procedure                               Abnormality         Status                     ---------                               -----------         ------                     CBC Auto Differential[[id number]]        Abnormal            Final result                 Please view results for these tests on the individual orders.    CBC Auto Differential [[id number]]  (Abnormal) Collected:  03/16/18 0549    Specimen:  Blood Updated:  03/16/18 0621     WBC 10.02 (H) 10*3/mm3      RBC 4.48 10*6/mm3      Hemoglobin 13.8 g/dL      Hematocrit 41.1 %      MCV 91.7 fL      MCH 30.8 pg      MCHC 33.6 g/dL      RDW 14.2 %      RDW-SD 47.8 (H) fl      MPV 9.7 fL      Platelets 240 10*3/mm3      Neutrophil % 78.9 %      Lymphocyte % 15.6 %      Monocyte % 5.1 %      Eosinophil % 0.0 %      Basophil % 0.1 %      Immature Grans % 0.3 %      Neutrophils, Absolute 7.91 10*3/mm3      Lymphocytes, Absolute 1.56 10*3/mm3      Monocytes, Absolute 0.51 10*3/mm3      Eosinophils, Absolute 0.00 10*3/mm3      Basophils, Absolute 0.01 10*3/mm3      Immature Grans, Absolute 0.03 (H) 10*3/mm3     T4, Free [[id number]] Collected:  03/16/18 0549    Specimen:  Blood Updated:  03/16/18 0609    TSH [[id number]] Collected:  03/16/18 0549    Specimen:  Blood Updated:  03/16/18 0609    Comprehensive Metabolic Panel [[id number]]  (Abnormal) Collected:  03/15/18 1024    Specimen:  Blood Updated:  03/15/18 1108     Glucose 98 mg/dL      BUN 29 (H) mg/dL      Creatinine 0.86 mg/dL      Sodium 143 mmol/L      Potassium 3.7 mmol/L      Chloride 105 mmol/L      CO2 25.0 mmol/L      Calcium 9.2 mg/dL      Total Protein 6.9 g/dL      Albumin 3.70 g/dL      ALT (SGPT) 34 U/L      AST (SGOT) 28 U/L      Alkaline Phosphatase 65 U/L      Total Bilirubin 1.0 mg/dL      eGFR Non  Amer 63  mL/min/1.73      Globulin 3.2 gm/dL      A/G Ratio 1.2 g/dL      BUN/Creatinine Ratio 33.7 (H)     Anion Gap 13.0 mmol/L     Narrative:       The MDRD GFR formula is only valid for adults with stable renal function between ages 18 and 70.    [place] [facility] [[id number]] Collected:  03/15/18 0936    Specimen:  Blood Updated:  03/15/18 1046    Narrative:       The following orders were created for panel order [place] [facility].  Procedure                               Abnormality         Status                     ---------                               -----------         ------                     Light Blue Top[[id number]]                                   Final result               Green Top (Gel)[[id number]]                                  Final result               Lavender Top[[id number]]                                     Final result               Gold Top - SST[[id number]]                                   Final result                 Please view results for these tests on the individual orders.    Light Blue Top [[id number]] Collected:  03/15/18 0936    Specimen:  Blood Updated:  03/15/18 1046     Extra Tube hold for add-on     Comment: Auto resulted       Green Top (Gel) [[id number]] Collected:  03/15/18 0936    Specimen:  Blood Updated:  03/15/18 1046     Extra Tube Hold for add-ons.     Comment: Auto resulted.       Lavender Top [[id number]] Collected:  03/15/18 0936    Specimen:  Blood Updated:  03/15/18 1046     Extra Tube hold for add-on     Comment: Auto resulted       Gold Top - SST [[id number]] Collected:  03/15/18 0936    Specimen:  Blood Updated:  03/15/18 1046     Extra Tube Hold for add-ons.     Comment: Auto resulted.       Protime-INR [[id number]]  (Abnormal) Collected:  03/15/18 0936    Specimen:  Blood Updated:  03/15/18 1022     Protime 31.0 (H) Seconds      INR 3.18 (H)    Narrative:       Therapeutic range for most indications is 2.0-3.0 INR,  or 2.5-3.5 for mechanical heart valves.    aPTT [[id number]]   (Abnormal) Collected:  03/15/18 0936    Specimen:  Blood Updated:  03/15/18 1022     PTT 58.2 (H) seconds     Narrative:       The recommended Heparin therapeutic range is 68-97 seconds.    CBC & Differential [[id number]] Collected:  03/15/18 0936    Specimen:  Blood Updated:  03/15/18 0946    Narrative:       The following orders were created for panel order CBC & Differential.  Procedure                               Abnormality         Status                     ---------                               -----------         ------                     CBC Auto Differential[[id number]]        Abnormal            Final result                 Please view results for these tests on the individual orders.    CBC Auto Differential [[id number]]  (Abnormal) Collected:  03/15/18 0936    Specimen:  Blood Updated:  03/15/18 0946     WBC 11.84 (H) 10*3/mm3      RBC 4.95 10*6/mm3      Hemoglobin 15.4 g/dL      Hematocrit 45.8 (H) %      MCV 92.5 fL      MCH 31.1 pg      MCHC 33.6 g/dL      RDW 14.4 %      RDW-SD 48.6 (H) fl      MPV 9.8 fL      Platelets 244 10*3/mm3      Neutrophil % 66.9 %      Lymphocyte % 25.8 %      Monocyte % 6.3 %      Eosinophil % 0.6 %      Basophil % 0.1 %      Immature Grans % 0.3 %      Neutrophils, Absolute 7.93 10*3/mm3      Lymphocytes, Absolute 3.05 10*3/mm3      Monocytes, Absolute 0.74 10*3/mm3      Eosinophils, Absolute 0.07 10*3/mm3      Basophils, Absolute 0.01 10*3/mm3      Immature Grans, Absolute 0.04 (H) 10*3/mm3             I reviewed the patient's new clinical results.    Assessment/Plan:     Active Hospital Problems (** Indicates Principal Problem)    Diagnosis Date Noted   • **Angioedema [T78.3XXA] 03/15/2018     -will monitor for any worsening, patient s/p Solumedrol 125 mg in ED, Benadryl 25 mg IV, and Pepcid 20 mg IV  -patient has improved since administration of medication, will continue PO benadryl and Pepcid with solumedrol 60 mg IV daily  -will discontinue lisinopril indefinitely,  will attempt to restart other medications.     • Atrial fibrillation, persistent [I48.1] 03/15/2018     -patient has decreased in rate since ED arrival from rate of 140s to 110s after labetolol 10 mg IV push  -patient has been off of amiodarone for unknown amount of days since 3/9 as she was told to stop one medication at a time    -discussed case with Dr. Adams who states that with her current rate of a-fib to hold off on a drip and simply place her on metoprolol tartrate 25 mg BID instead of her home Toprol XL and monitor rate that way.       • Essential hypertension [I10] 01/27/2017     -will stop lisinopril  -continue home lasix and home metoprolol     • S/P MVR (mitral valve repair) [Z98.890] 01/27/2017     -will continue patient on warfarin, pharmacy to dose, INR between 2.5-3.5     • Hypothyroidism [E03.9]      -continue home levothyroxine  -will order TSH and fT4        Resolved Hospital Problems    Diagnosis Date Noted Date Resolved   No resolved problems to display.       DVT prophylaxis: Coumadin  Code status is Full Code    Lars Almaguer Jr., M.D.  PGY1  Family Practice Resident  [address] [place] [address]  Phone: [phone]  Fax: [phone]      This document has been electronically signed by Lars Almaguer Jr, MD on March 16, 2018 6:58 AM

## 2018-03-16 NOTE — PLAN OF CARE
Problem: Patient Care Overview  Goal: Plan of Care Review  Outcome: Ongoing (interventions implemented as appropriate)    Goal: Individualization and Mutuality  Outcome: Ongoing (interventions implemented as appropriate)    Goal: Discharge Needs Assessment  Outcome: Ongoing (interventions implemented as appropriate)      Problem: Arrhythmia/Dysrhythmia (Symptomatic) (Adult)  Goal: Signs and Symptoms of Listed Potential Problems Will be Absent, Minimized or Managed (Arrhythmia/Dysrhythmia)  Outcome: Ongoing (interventions implemented as appropriate)

## 2018-03-16 NOTE — PROGRESS NOTES
Spoke to Luis Friedman RN who stated pt called her to discuss being on higher dose of coumadin. Per Luis during the discussion pt stated she has been taking a multivitamin that she had not disclosed to the coumadin clinic.

## 2018-03-16 NOTE — PROGRESS NOTES
PT called and states she was dc'd from hospital today for allergic reaction to Lisinopril. PT was started on Metoprolol, Prevacid (x14 days) and Prednisone 40mg daily x3 days. PT's had 2.5mg Coumadin last night while in hospital. PT instructed to take 2.5mg Friday-Monday and was given appt on Tuesday for FU. PT verbalizes understanding.

## 2018-03-16 NOTE — DISCHARGE SUMMARY
[address] [address]  T - [phone]     DISCHARGE SUMMARY         PATIENT  DEMOGRAPHICS   PATIENT NAME: Miah Hogan                      PHYSICIAN: Ortega Parra MD  : 1935  MRN: [id number]    ADMISSION/DISCHARGE INFO   ADMISSION DATE: 3/15/2018   DISCHARGE DATE: 3/16/2018    ADMISSION DIAGNOSES: Urticaria [L50.9]  Hypertensive urgency [I16.0]  Atrial fibrillation, persistent [I48.1]   Angioedema    DISCHARGE DIAGNOSES:     Active Problems:    Hypothyroidism    S/P MVR (mitral valve repair)    Essential hypertension    Atrial fibrillation, persistent      SERVICE: Family Medicine  ATTENDING PROVIDER: Dr. Mcpherson  RESIDENT: Ortega Parra MD     CONSULTS   Consult Orders (all)     None          PROCEDURES     Imaging Results (last 24 hours)     ** No results found for the last 24 hours. **          Xr Chest 1 View    Result Date: 3/15/2018  Narrative: Chest single view. CLINICAL INDICATION: Shortness of breath. Chest pain COMPARISON: 2013. FINDINGS: Cardiomegaly. Midline sternal sutures from prior cardiac surgery. Lung fields clear. No active infiltrates, masses, or pulmonary vascular engorgement. No changes since prior exam.     Impression: CONCLUSION: No evidence of active disease. Electronically signed by:  Drew Romo MD  3/15/2018 10:32 AM CDT Workstation: [id number]      HISTORY OF PRESENT ILLNESS   Miah Hogan is a 82 y.o. female who presents with rash and swelling of lips. Patient reports this began on 3/9 in the AM. Rash began first, starting on abdomen and buttock fold. Rash has since spread throughout her body. Patient called Dr. Adams's office about the issue on 3/9 and was told to stop all of her medications by his nurse. Swelling of lips began on 3/12 and has progressively gotten worse. Patient has stopped all medications except for coumadin. Despite stopping her medications she has progressively gotten worse  in terms of rash that is itchy and swelling of lips. Patient received a steroid shot at Angela Urbano's office on 3/13 and states her symptoms improved until today where they worsened again.      Patient has not had any involvement of her tongue, no difficulty breathing, and no fevers reported. Rash is described as itchy and bumpy all over her body. Since receiving her Solumedrol, Pepcid, and benadryl here in the ER patient reports her rash has improved and itchiness has subsided some.     Cardiologist is Kevin which she sees for atrial fibrillation and required a mitral valve (mechanical) replacement in 2001. Patient denies any shortness of breath or chest pain at this time. Because she has stopped taking her medications, her a-fib rate has increased to above 100.     HOSPITAL COURSE   During her hospital stay the patient was admitted and brought to the floor. She had been given IV pepcid, benadryl, and solumedrol  mg in the emergency department and was continued on the PO versions of benadryl and pepcid with IV solumedrol 60 mg the following day. Patient's symptoms had completely resolved the following day after admission with her lips and face swelling come back down to baseline and her urticaria resolving completely. Patient was breathing without difficulty and tolerating a regular diet with no issues swallowing. Because of her initial a-fib with RVR she was changed to metoprolol tartrate 25 mg BID from Toprol XL 50 mg as recommended by Dr. Brown. This managed to bring her heart rate back down below 100 throughout her stay. Dr. Brown also stated that she could remain on the metoprolol tartrate after discharge if her blood pressure remained stable and a-fib remained rate controlled. Her blood pressure remained stable off of her lisinopril as this was discontinued. Lisinopril was the primary suspect to the cause of her symptoms which was discontinued on admission and added to her allergy list. Patient was deemed  well for discharge with a prescription to complete a total of 5 days of steroids, and for benadryl and pepcid until she was seen by her PCP. Appointment with Angela Urbano, PCP scheduled for within the next week.     DISCHARGE CONDITION   Stable    DISPOSITION   To Home      DISCHARGE MEDICATIONS        Your medication list      START taking these medications      Instructions Last Dose Given Next Dose Due   diphenhydrAMINE 25 mg capsule  Commonly known as:  BENADRYL  Start taking on:  3/17/2018      Take 1 capsule by mouth Daily.       famotidine 20 MG tablet  Commonly known as:  PEPCID  Start taking on:  3/17/2018      Take 1 tablet by mouth Daily.       metoprolol tartrate 25 MG tablet  Commonly known as:  LOPRESSOR      Take 1 tablet by mouth Every 12 (Twelve) Hours.       predniSONE 20 MG tablet  Commonly known as:  DELTASONE  Start taking on:  3/17/2018      Take 2 tablets by mouth Daily for 3 days.          CHANGE how you take these medications      Instructions Last Dose Given Next Dose Due   warfarin 2.5 MG tablet  Commonly known as:  COUMADIN  What changed:  additional instructions      Take 1 tablet by mouth Daily.          CONTINUE taking these medications      Instructions Last Dose Given Next Dose Due   cholecalciferol 1000 units tablet  Commonly known as:  VITAMIN D3  Notes to patient:  03/17/2018      Take 2,000 Units by mouth Daily.       levothyroxine 88 MCG tablet  Commonly known as:  SYNTHROID, LEVOTHROID  Notes to patient:  03/17/2018 at 9:00 a.m.      Take 1 tablet by mouth Daily.          STOP taking these medications    metoprolol succinate XL 25 MG 24 hr tablet  Commonly known as:  TOPROL-XL              Where to Get Your Medications      You can get these medications from any pharmacy    Bring a paper prescription for each of these medications   diphenhydrAMINE 25 mg capsule   famotidine 20 MG tablet   metoprolol tartrate 25 MG tablet   predniSONE 20 MG tablet         INSTRUCTIONS   Activity:    Activity Instructions     Activity as Tolerated             Diet:   Diet Instructions     Diet: Cardiac       Discharge Diet:  Cardiac          Special Instructions: Patient instructed to call M.D. or return to ED with worsening shortness of breath, chest pain, fever greater than 100.4°F or any other medical concerns.    FOLLOW UP   Additional Instructions for the Follow-ups that You Need to Schedule     Discharge Follow-up with PCP    As directed      Follow Up Details:  Dr. Angela Urbano in 1 week.           Follow-up Information     Angela Urbano, DO Follow up on 3/22/2018.    Specialty:  Family Medicine  Why:  Go to appointment at 9:00 a.m.  Contact information:  [address] [address]  [phone]                        Dr. Mcpherson is the attending at time of discharge, He is aware of the patient's status and agrees with the above discharge summary.           This document has been electronically signed by Ortega Parra MD on March 16, 2018 12:25 PM      Ortega Parra MD  03/16/18  12:25 PM

## 2018-03-20 ENCOUNTER — DOCUMENTATION (OUTPATIENT)
Dept: CARDIAC SURGERY | Facility: CLINIC | Age: 83
End: 2018-03-20

## 2018-03-23 ENCOUNTER — OFFICE VISIT (OUTPATIENT)
Dept: CARDIOLOGY | Facility: CLINIC | Age: 83
End: 2018-03-23

## 2018-03-23 VITALS
HEART RATE: 63 BPM | SYSTOLIC BLOOD PRESSURE: 126 MMHG | DIASTOLIC BLOOD PRESSURE: 86 MMHG | HEIGHT: 63 IN | BODY MASS INDEX: 34.55 KG/M2 | WEIGHT: 195 LBS

## 2018-03-23 DIAGNOSIS — E78.2 MIXED HYPERLIPIDEMIA: ICD-10-CM

## 2018-03-23 DIAGNOSIS — I48.91 ATRIAL FIBRILLATION, UNSPECIFIED TYPE (HCC): Primary | ICD-10-CM

## 2018-03-23 DIAGNOSIS — I10 ESSENTIAL HYPERTENSION: ICD-10-CM

## 2018-03-23 DIAGNOSIS — Z98.890 S/P MVR (MITRAL VALVE REPAIR): ICD-10-CM

## 2018-03-23 DIAGNOSIS — I48.19 ATRIAL FIBRILLATION, PERSISTENT (HCC): ICD-10-CM

## 2018-03-23 DIAGNOSIS — I34.9 NONRHEUMATIC MITRAL VALVE DISORDER: ICD-10-CM

## 2018-03-23 PROCEDURE — 99214 OFFICE O/P EST MOD 30 MIN: CPT | Performed by: INTERNAL MEDICINE

## 2018-03-23 NOTE — PROGRESS NOTES
Miah Hogan  82 y.o. female    03/23/2018  1. Atrial fibrillation, unspecified type    2. Atrial fibrillation, persistent    3. Essential hypertension    4. Mixed hyperlipidemia    5. S/P MVR (mitral valve repair)    6. Nonrheumatic mitral valve disorder, unspecified        History of Present Illness     Mrs. Garces I here for folow-up ofher multiple cardiac issues.  She as admitted to [facility] recently following an allergic reaction to most likely lisinopril.  She had extensive rash and angioedema.  Lisinopril was discontinued.  She has done well following discharge from the hospital and her blood pressure has remained in the normal range.  She denied any chest pain, palpitation, dizziness or syncope.  Clinically her mechanical prosthetic valve is functioning well.  Her PT and INR are in the therapeutic range.        SUBJECTIVE    Allergies   Allergen Reactions   • Codeine Sulfate    • Lisinopril Angioedema         Past Medical History:   Diagnosis Date   • Atrial fibrillation    • Dizziness and giddiness    • Edema    • Hyperlipidemia    • Hypertension    • Hypothyroidism    • Hypothyroidism    • Long term (current) use of anticoagulants    • Nonrheumatic mitral valve disorder, unspecified          Past Surgical History:   Procedure Laterality Date   • APPENDECTOMY     • MITRAL VALVE REPLACEMENT           History reviewed. No pertinent family history.      Social History     Social History   • Marital status:      Spouse name: N/A   • Number of children: N/A   • Years of education: N/A     Occupational History   • Not on file.     Social History Main Topics   • Smoking status: Never Smoker   • Smokeless tobacco: Never Used   • Alcohol use No   • Drug use: No   • Sexual activity: Defer     Other Topics Concern   • Not on file     Social History Narrative   • No narrative on file         Current Outpatient Prescriptions   Medication Sig Dispense Refill   • cholecalciferol (VITAMIN D3) 1000 units  "tablet Take 2,000 Units by mouth Daily.     • famotidine (PEPCID) 20 MG tablet Take 1 tablet by mouth Daily. (Patient taking differently: Take 20 mg by mouth Daily. Stop 3/29/18) 14 tablet 0   • levothyroxine (SYNTHROID, LEVOTHROID) 88 MCG tablet Take 1 tablet by mouth Daily.  6   • metoprolol tartrate (LOPRESSOR) 25 MG tablet Take 1 tablet by mouth Every 12 (Twelve) Hours. 60 tablet 0   • warfarin (COUMADIN) 2.5 MG tablet Take 1 tablet by mouth Daily. (Patient taking differently: Take 2.5 mg by mouth Daily. Take 2.5 mg by mouth on SuMoWeThSa and 5 mg by mouth on TuFr) 30 tablet 6     No current facility-administered medications for this visit.          OBJECTIVE    /86   Pulse 63   Ht 160 cm (63\")   Wt 88.5 kg (195 lb)   BMI 34.54 kg/m²         Review of Systems     Constitutional:  Denies recent weight loss, weight gain, fever or chills, no change in exercise tolerance     HENT:  Denies any hearing loss, epistaxis, hoarseness, or difficulty speaking.     Eyes: Wears eyeglasses or contact lenses     Respiratory:  Denies dyspnea with exertion,no cough, wheezing, or hemoptysis.     Cardiovascular: Negative for palpations, chest pain, orthopnea, PND, peripheral edema, syncope, or claudication.     Gastrointestinal:  Denies change in bowel habits, dyspepsia, ulcer disease, hematochezia, or melena.     Endocrine: Negative for cold intolerance, heat intolerance, polydipsia, polyphagia and polyuria. Denies any history of weight change, heat/cold intolerance, polydipsia, polyuria     Genitourinary: Negative.      Musculoskeletal: Denies any history of arthritic symptoms or back problems     Skin:  Denies any change in hair or nails, rashes, or skin lesions. Recent allergic reaction to most likely lisinopril    Allergic/Immunologic: Negative.  Negative for environmental allergies, food allergies and immunocompromised state.     Neurological:  Denies any history of recurrent headaches, strokes, TIA, or seizure " disorder.     Hematological: Denies any food allergies, seasonal allergies, bleeding disorders, or lymphadenopathy.     Psychiatric/Behavioral: Denies any history of depression, substance abuse, or change in cognitive function.         Physical Exam     Constitutional: Cooperative, alert and oriented, well-developed, in no acute distress.     HENT:   Head: Normocephalic, normal hair patterns, no masses or tenderness.  Ears, Nose, and Throat: No gross abnormalities. No pallor or cyanosis.   Eyes: EOMS intact, PERRL, conjunctivae and lids unremarkable. Fundoscopic exam and visual fields not performed.   Neck: No palpable masses or adenopathy, no thyromegaly, no JVD, carotid pulses are full and equal bilaterally and without  Bruits.     Cardiovascular: Irregular rhythm, normal prosthetic valve sounds,  no S3 or S4.  2/6 systolic murmur, No gallops, or rubs detected.     Pulmonary/Chest: Chest: normal symmetry, no tenderness to palpation, normal respiratory excursion, no intercostal retraction, no use of accessory muscles.            Pulmonary: Normal breath sounds. No rales or ronchi.    Abdominal: Abdomen soft, bowel sounds normoactive, no masses, no hepatosplenomegaly, non-tender, no bruits.     Musculoskeletal: No deformities, clubbing, cyanosis, erythema, or edema observed. There are no spinal abnormalities noted. Normal muscle strength and tone. Pulses full and equal in all extremities, no bruits auscultated.     Neurological: No gross motor or sensory deficits noted, affect appropriate, oriented to time, person, place.     Skin: Warm and dry to the touch, no apparent skin lesions or masses noted.     Psychiatric: She has a normal mood and affect. Her behavior is normal. Judgment and thought content normal.         Procedures      Lab Results   Component Value Date    WBC 10.02 (H) 03/16/2018    HGB 13.8 03/16/2018    HCT 41.1 03/16/2018    MCV 91.7 03/16/2018     03/16/2018     Lab Results   Component  Value Date    GLUCOSE 113 (H) 03/16/2018    BUN 23 (H) 03/16/2018    CREATININE 0.78 03/16/2018    EGFRIFNONA 71 03/16/2018    BCR 29.5 (H) 03/16/2018    CO2 28.0 03/16/2018    CALCIUM 9.3 03/16/2018    ALBUMIN 3.50 03/16/2018    LABIL2 1.1 03/16/2018    AST 21 03/16/2018    ALT 31 03/16/2018     Lab Results   Component Value Date    CHOL 234 (H) 01/27/2017     Lab Results   Component Value Date    TRIG 199 01/27/2017    TRIG 190 09/01/2015     Lab Results   Component Value Date    HDL 52 (L) 01/27/2017     No components found for: LDLCALC  Lab Results   Component Value Date     (H) 01/27/2017     09/01/2015     No results found for: HDLLDLRATIO  No components found for: CHOLHDL  No results found for: HGBA1C  Lab Results   Component Value Date    TSH 0.080 (L) 03/16/2018           ASSESSMENT AND PLAN  Ms. Hogan is stable with no evidence of angina or congestive heart failure.  Her mechanical prosthetic valve in the mitral position is functioning well.  Metoprolol tartrate and anticoagulation with warfarin has been continued.    Miah was seen today for follow-up.    Diagnoses and all orders for this visit:    Atrial fibrillation, unspecified type    Atrial fibrillation, persistent    Essential hypertension    Mixed hyperlipidemia    S/P MVR (mitral valve repair)    Nonrheumatic mitral valve disorder, unspecified        Kg Adams MD  3/23/2018  2:35 PM

## 2018-03-26 NOTE — PROGRESS NOTES
.  FAMILY MEDICINE PROGRESS NOTE  NAME: Miah Hogan  : 1935  MRN: [id number]     LOS: 0 days   Prior  PROVIDER OF SERVICE:     Chief Complaint:  Angioedema    Subjective     Interval History:  History taken from: patient  Subjective: No overnight events. Has a decrease in facial and leg swelling. Is off of lisinopril and started on Toprol, has improvement of blood pressure.     Review of Systems:   Review of Systems   Constitutional: Negative for chills and fever.   Respiratory: Negative for shortness of breath.    Cardiovascular: Negative for chest pain.   Gastrointestinal: Negative for abdominal pain, diarrhea, nausea and vomiting.   Genitourinary: Negative for dysuria.   Neurological: Negative for dizziness.       Objective     Vital Signs       Physical Exam  Physical Exam   Constitutional: She is oriented to person, place, and time. She appears well-developed and well-nourished.   Cardiovascular: Normal rate, regular rhythm and normal heart sounds.  Exam reveals no gallop and no friction rub.    No murmur heard.  Pulmonary/Chest: Effort normal and breath sounds normal. No respiratory distress. She has no wheezes. She has no rales.   Abdominal: Soft. Bowel sounds are normal. There is no tenderness.   Musculoskeletal: Normal range of motion. She exhibits no edema.   Neurological: She is alert and oriented to person, place, and time.   Skin: Skin is warm and dry.   Psychiatric: She has a normal mood and affect. Her behavior is normal.   Vitals reviewed.      Medication Review  No current facility-administered medications for this encounter.     Current Outpatient Prescriptions:   •  cholecalciferol (VITAMIN D3) 1000 units tablet, Take 2,000 Units by mouth Daily., Disp: , Rfl:   •  levothyroxine (SYNTHROID, LEVOTHROID) 88 MCG tablet, Take 1 tablet by mouth Daily., Disp: , Rfl: 6  •  warfarin (COUMADIN) 2.5 MG tablet, Take 1 tablet by mouth Daily. (Patient taking differently: Take 2.5 mg by mouth  Daily. Take 2.5 mg by mouth on SuMoWeThSa and 5 mg by mouth on TuFr), Disp: 30 tablet, Rfl: 6  •  famotidine (PEPCID) 20 MG tablet, Take 1 tablet by mouth Daily. (Patient taking differently: Take 20 mg by mouth Daily. Stop 3/29/18), Disp: 14 tablet, Rfl: 0  •  metoprolol tartrate (LOPRESSOR) 25 MG tablet, Take 1 tablet by mouth Every 12 (Twelve) Hours., Disp: 60 tablet, Rfl: 0     Diagnostic Data      I reviewed the patient's new clinical results.  I reviewed the patient's other test results and agree with the interpretation  I reviewed the patient's new clinical results and imaging.      Assessment/Plan     Active Hospital Problems (** Indicates Principal Problem)    Diagnosis Date Noted   • Atrial fibrillation, persistent [I48.1] 03/15/2018     -patient has decreased in rate since ED arrival from rate of 140s to 110s after labetolol 10 mg IV push  -patient has been off of amiodarone for unknown amount of days since 3/9 as she was told to stop one medication at a time    -discussed case with Dr. Adams who states that with her current rate of a-fib to hold off on a drip and simply place her on metoprolol tartrate 25 mg BID instead of her home Toprol XL and monitor rate that way.       • Essential hypertension [I10] 01/27/2017     -will stop lisinopril  -continue home lasix and home metoprolol     • S/P MVR (mitral valve repair) [Z98.890] 01/27/2017     -will continue patient on warfarin, pharmacy to dose, INR between 2.5-3.5     • Hypothyroidism [E03.9]      -continue home levothyroxine  -TSH -- 0.080  -FT4 -- 1.31        Resolved Hospital Problems    Diagnosis Date Noted Date Resolved   • **Angioedema [T78.3XXA] 03/15/2018 03/16/2018     -will monitor for any worsening, patient s/p Solumedrol 125 mg in ED, Benadryl 25 mg IV, and Pepcid 20 mg IV  -patient has improved since administration of medication, will continue PO benadryl and Pepcid with solumedrol 60 mg IV daily  -will discontinue lisinopril  indefinitely, will attempt to restart other medications.           DVT prophylaxis: SCDs/TEDs      Disposition:Home  in today      This document has been electronically signed by Aleksandr Mcpherson MD on March 26, 2018 10:38 AM

## 2018-03-27 ENCOUNTER — ANTICOAGULATION VISIT (OUTPATIENT)
Dept: CARDIAC SURGERY | Facility: CLINIC | Age: 83
End: 2018-03-27

## 2018-03-27 VITALS — HEART RATE: 74 BPM | SYSTOLIC BLOOD PRESSURE: 110 MMHG | DIASTOLIC BLOOD PRESSURE: 63 MMHG

## 2018-03-27 DIAGNOSIS — Z79.01 LONG TERM CURRENT USE OF ANTICOAGULANT THERAPY: ICD-10-CM

## 2018-03-27 DIAGNOSIS — I48.91 ATRIAL FIBRILLATION, UNSPECIFIED TYPE (HCC): ICD-10-CM

## 2018-03-27 DIAGNOSIS — Z98.890 S/P MVR (MITRAL VALVE REPAIR): ICD-10-CM

## 2018-03-27 LAB — INR PPP: 2.5 (ref 0.9–1.1)

## 2018-03-27 PROCEDURE — 99211 OFF/OP EST MAY X REQ PHY/QHP: CPT | Performed by: NURSE PRACTITIONER

## 2018-03-27 PROCEDURE — 85610 PROTHROMBIN TIME: CPT | Performed by: NURSE PRACTITIONER

## 2018-03-27 NOTE — PROGRESS NOTES
Pt states she never spoke to anyone regarding her medication changes as noted by CC documentation. I explained to pt that we would not have that information unless she or a family representative called us. Pt continued to deny speaking with anyone and states no one would have called on her behalf. Pt states she will finish Pepcid Friday and has been taking 2.5mg of coumadin nightly since being discharged from hospital. Due to potential for Pepcid to increase INR and pt finishing Friday and being on lower dose of coumadin than usual dose was slightly increased and appt made for next week; pt verbalized an understanding. Patient instructed regarding medication; results given and questions answered. Nutritional counseling given.  Dietary factors affecting therapy addressed.  Patient instructed to monitor for excessive bruising or bleeding.           This document has been electronically signed by EBONY Morocho on March 28, 2018 8:59 AM

## 2018-04-05 ENCOUNTER — ANTICOAGULATION VISIT (OUTPATIENT)
Dept: CARDIAC SURGERY | Facility: CLINIC | Age: 83
End: 2018-04-05

## 2018-04-05 DIAGNOSIS — Z98.890 S/P MVR (MITRAL VALVE REPAIR): ICD-10-CM

## 2018-04-05 DIAGNOSIS — Z79.01 LONG TERM CURRENT USE OF ANTICOAGULANT THERAPY: ICD-10-CM

## 2018-04-05 DIAGNOSIS — I48.91 ATRIAL FIBRILLATION, UNSPECIFIED TYPE (HCC): ICD-10-CM

## 2018-04-05 LAB — INR PPP: 3.4 (ref 0.9–1.1)

## 2018-04-05 PROCEDURE — 85610 PROTHROMBIN TIME: CPT | Performed by: NURSE PRACTITIONER

## 2018-04-05 PROCEDURE — 99211 OFF/OP EST MAY X REQ PHY/QHP: CPT | Performed by: NURSE PRACTITIONER

## 2018-04-05 NOTE — PROGRESS NOTES
PT remembers speaking to CC about med changes now. Denies any current med changes or bleeding issues. PT denies any s/s of blood clot. Due to rise in INR, dose was cut back and pt will be seen in 2.5 wks at her request. Patient instructed regarding medication; results given and questions answered. Nutritional counseling given.  Dietary factors affecting therapy addressed.  Patient instructed to monitor for excessive bruising or bleeding. PT verbalizes understanding.   Electronically signed by EBONY Moeller

## 2018-04-24 ENCOUNTER — ANTICOAGULATION VISIT (OUTPATIENT)
Dept: CARDIAC SURGERY | Facility: CLINIC | Age: 83
End: 2018-04-24

## 2018-04-24 VITALS — SYSTOLIC BLOOD PRESSURE: 134 MMHG | HEART RATE: 76 BPM | DIASTOLIC BLOOD PRESSURE: 74 MMHG

## 2018-04-24 DIAGNOSIS — I48.91 ATRIAL FIBRILLATION, UNSPECIFIED TYPE (HCC): ICD-10-CM

## 2018-04-24 DIAGNOSIS — Z98.890 S/P MVR (MITRAL VALVE REPAIR): ICD-10-CM

## 2018-04-24 DIAGNOSIS — Z79.01 LONG TERM CURRENT USE OF ANTICOAGULANT THERAPY: ICD-10-CM

## 2018-04-24 LAB — INR PPP: 2.1 (ref 0.9–1.1)

## 2018-04-24 PROCEDURE — 85610 PROTHROMBIN TIME: CPT | Performed by: NURSE PRACTITIONER

## 2018-04-24 NOTE — PROGRESS NOTES
Patient states no med changes or bleeding problems or unexplained bruising. Patient instructed to continue current dosing schedule. Verbalizes understanding. Will recheck 1 month. Patient instructed regarding medication; results given and questions answered. Nutritional counseling given.  Dietary factors affecting therapy addressed.  Patient instructed to monitor for excessive bruising or bleeding.          This document has been electronically signed by EBONY Morocho on April 25, 2018 9:21 AM

## 2018-05-07 ENCOUNTER — OFFICE VISIT (OUTPATIENT)
Dept: PODIATRY | Facility: CLINIC | Age: 83
End: 2018-05-07

## 2018-05-07 VITALS
DIASTOLIC BLOOD PRESSURE: 89 MMHG | HEIGHT: 64 IN | SYSTOLIC BLOOD PRESSURE: 126 MMHG | WEIGHT: 193 LBS | OXYGEN SATURATION: 96 % | HEART RATE: 70 BPM | BODY MASS INDEX: 32.95 KG/M2

## 2018-05-07 DIAGNOSIS — S82.61XA CLOSED FRACTURE OF DISTAL LATERAL MALLEOLUS OF RIGHT FIBULA, INITIAL ENCOUNTER: ICD-10-CM

## 2018-05-07 DIAGNOSIS — M25.571 ACUTE RIGHT ANKLE PAIN: Primary | ICD-10-CM

## 2018-05-07 PROCEDURE — 99203 OFFICE O/P NEW LOW 30 MIN: CPT | Performed by: PODIATRIST

## 2018-05-07 PROCEDURE — 27786 TREATMENT OF ANKLE FRACTURE: CPT | Performed by: PODIATRIST

## 2018-05-07 NOTE — PROGRESS NOTES
Miah Hogan  1935  82 y.o. female     Patient presents today with a complaint of right ankle pain.    05/07/2018    Chief Complaint   Patient presents with   • Right Ankle - Pain       History of Present Illness    Miah Hogan is a 82 y.o.female who presents to clinic with chief complaint of right ankle pain.  Patient states last Thursday night May 3 she was getting out of her chair when she twisted her right ankle.  She immediately had pain and swelling and was unable to bear weight.  Patient states that she went to the emergency department [facility] and had x-rays taken.  She was given a cam boot and a follow up here.  She is currently ambulating in a wheelchair with a low tide cam boot.  She is unable to bear weight still.  She rates her pain as a 10 out of 10 with pressure.  She has no other pedal complaints.    Past Medical History:   Diagnosis Date   • Atrial fibrillation    • Dizziness and giddiness    • Edema    • Hyperlipidemia    • Hypertension    • Hypothyroidism    • Hypothyroidism    • Long term (current) use of anticoagulants    • Nonrheumatic mitral valve disorder, unspecified          Past Surgical History:   Procedure Laterality Date   • APPENDECTOMY     • KNEE SURGERY Right    • MITRAL VALVE REPLACEMENT           Family History   Problem Relation Age of Onset   • Cancer Mother        Allergies   Allergen Reactions   • Lisinopril Angioedema   • Codeine Sulfate Unknown (See Comments)     Pt is unsure       Social History     Social History   • Marital status:      Spouse name: N/A   • Number of children: N/A   • Years of education: N/A     Occupational History   • Not on file.     Social History Main Topics   • Smoking status: Never Smoker   • Smokeless tobacco: Never Used   • Alcohol use No   • Drug use: No   • Sexual activity: Defer     Other Topics Concern   • Not on file     Social History Narrative   • No narrative on file         Current Outpatient Prescriptions  "  Medication Sig Dispense Refill   • cholecalciferol (VITAMIN D3) 1000 units tablet Take 2,000 Units by mouth Daily.     • famotidine (PEPCID) 20 MG tablet Take 1 tablet by mouth Daily. (Patient taking differently: Take 20 mg by mouth Daily. Stop 3/29/18) 14 tablet 0   • levothyroxine (SYNTHROID, LEVOTHROID) 88 MCG tablet Take 1 tablet by mouth Daily.  6   • metoprolol tartrate (LOPRESSOR) 25 MG tablet Take 1 tablet by mouth Every 12 (Twelve) Hours. 60 tablet 0   • warfarin (COUMADIN) 2.5 MG tablet Take 1 tablet by mouth Daily. (Patient taking differently: Take 2.5 mg by mouth Daily. Take 2.5 mg by mouth on SuMoWeThSa and 5 mg by mouth on TuFr) 30 tablet 6     No current facility-administered medications for this visit.          OBJECTIVE    /89   Pulse 70   Ht 162.6 cm (64\")   Wt 87.5 kg (193 lb)   SpO2 96%   BMI 33.13 kg/m²       Review of Systems   Constitutional: Negative.    HENT: Negative.    Eyes: Negative.    Respiratory: Positive for shortness of breath.    Cardiovascular: Negative.    Gastrointestinal: Negative.    Endocrine: Negative.    Genitourinary: Negative.    Musculoskeletal:        Right ankle pain with ambulation   Skin: Negative.    Allergic/Immunologic: Negative.    Neurological: Negative.    Hematological: Negative.    Psychiatric/Behavioral: Negative.            Physical Exam    Gait: antalgic    Assistive Device: wheelchair and cam boot    Right Lower Extremity    Cardiovascular:    DP/PT pulses palpable    Significant edema and ecchymosis noted diffusely about the right ankle     Musculoskeletal:  Muscle strength deferred  Pain on palpation to the distal lateral malleolus    Dermatological:   Skin is warm, dry and intact    Webspaces 1-4 bilateral are clean, dry and intact.   No subcutaneous nodules or masses noted      Neurological:   Protective sensation intact    Sensation intact to light touch      Radiographs: 3 views of the right ankle were obtained today.  Nondisplaced " transverse fracture to the distal lateral malleolus noted.  Ankle mortise is in good alignment.        Procedures        ASSESSMENT AND PLAN    Miah was seen today for pain.    Diagnoses and all orders for this visit:    Acute right ankle pain    Closed fracture of distal lateral malleolus of right fibula, initial encounter  -     XR Ankle 3+ View Right      - Comprehensive foot and ankle exam performed.   - X-rays taken and reviewed  - Dispensed fracture boot  - transition to wbat in boot  - ice and elevate at hoem  - All questions were answered to the patients satisfaction.  - RTC 2 weeks for repeat radiographs          This document has been electronically signed by Ousmane Silveira DPM on May 8, 2018 7:21 AM     5/8/2018  7:21 AM

## 2018-05-21 ENCOUNTER — OFFICE VISIT (OUTPATIENT)
Dept: PODIATRY | Facility: CLINIC | Age: 83
End: 2018-05-21

## 2018-05-21 VITALS — HEART RATE: 76 BPM | OXYGEN SATURATION: 96 % | HEIGHT: 64 IN | WEIGHT: 193 LBS | BODY MASS INDEX: 32.95 KG/M2

## 2018-05-21 DIAGNOSIS — S82.61XD CLOSED DISPLACED FRACTURE OF LATERAL MALLEOLUS OF RIGHT FIBULA WITH ROUTINE HEALING: Primary | ICD-10-CM

## 2018-05-21 PROCEDURE — 99024 POSTOP FOLLOW-UP VISIT: CPT | Performed by: PODIATRIST

## 2018-05-21 NOTE — PROGRESS NOTES
Miah Hogan  1935  82 y.o. female     Patient presents today for a recheck of her right ankle with repeat xrays.    05/21/2018     Chief Complaint   Patient presents with   • Right Ankle - Follow-up       History of Present Illness    Patient presents to clinic today for follow-up of her right ankle fracture.  Date of injury May 3, 2018.  She is ambulating in a cam boot.  She denies pain.    Past Medical History:   Diagnosis Date   • Atrial fibrillation    • Dizziness and giddiness    • Edema    • Hyperlipidemia    • Hypertension    • Hypothyroidism    • Hypothyroidism    • Long term (current) use of anticoagulants    • Nonrheumatic mitral valve disorder, unspecified          Past Surgical History:   Procedure Laterality Date   • APPENDECTOMY     • KNEE SURGERY Right    • MITRAL VALVE REPLACEMENT           Family History   Problem Relation Age of Onset   • Cancer Mother        Allergies   Allergen Reactions   • Lisinopril Angioedema   • Codeine Sulfate Unknown (See Comments)     Pt is unsure       Social History     Social History   • Marital status:      Spouse name: N/A   • Number of children: N/A   • Years of education: N/A     Occupational History   • Not on file.     Social History Main Topics   • Smoking status: Never Smoker   • Smokeless tobacco: Never Used   • Alcohol use No   • Drug use: No   • Sexual activity: Defer     Other Topics Concern   • Not on file     Social History Narrative   • No narrative on file         Current Outpatient Prescriptions   Medication Sig Dispense Refill   • cholecalciferol (VITAMIN D3) 1000 units tablet Take 2,000 Units by mouth Daily.     • famotidine (PEPCID) 20 MG tablet Take 1 tablet by mouth Daily. (Patient taking differently: Take 20 mg by mouth Daily. Stop 3/29/18) 14 tablet 0   • levothyroxine (SYNTHROID, LEVOTHROID) 88 MCG tablet Take 1 tablet by mouth Daily.  6   • metoprolol tartrate (LOPRESSOR) 25 MG tablet Take 1 tablet by mouth Every 12 (Twelve)  "Hours. 60 tablet 6   • warfarin (COUMADIN) 2.5 MG tablet Take 1 tablet by mouth Daily. (Patient taking differently: Take 2.5 mg by mouth Daily. Take 2.5 mg by mouth on SuMoWeThSa and 5 mg by mouth on TuFr) 30 tablet 6     No current facility-administered medications for this visit.          OBJECTIVE    Pulse 76   Ht 162.6 cm (64\")   Wt 87.5 kg (193 lb)   SpO2 96%   BMI 33.13 kg/m²       Review of Systems   Constitutional: Negative.    HENT: Negative.    Eyes: Negative.    Respiratory: Positive for shortness of breath.    Cardiovascular: Negative.    Gastrointestinal: Negative.    Endocrine: Negative.    Genitourinary: Negative.    Musculoskeletal: Negative.    Skin: Negative.    Allergic/Immunologic: Negative.    Neurological: Negative.    Hematological: Negative.    Psychiatric/Behavioral: Negative.            Physical Exam    Gait: antalgic    Assistive Device: walker and cam boot    Right Lower Extremity    Cardiovascular:    DP/PT pulses palpable    Improved edema and ecchymosis noted diffusely about the right ankle     Musculoskeletal:  Muscle strength deferred  Improve pain on palpation to the distal lateral malleolus    Dermatological:   Skin is warm, dry and intact    Webspaces 1-4 bilateral are clean, dry and intact.   No subcutaneous nodules or masses noted      Neurological:   Protective sensation intact    Sensation intact to light touch      Radiographs: 3 views of the right ankle were obtained today.  Nondisplaced transverse fracture to the distal lateral malleolus noted.  Ankle mortise is in good alignment.        Procedures        ASSESSMENT AND PLAN    Miah was seen today for follow-up.    Diagnoses and all orders for this visit:    Closed displaced fracture of lateral malleolus of right fibula with routine healing  -     XR Ankle 3+ View Right      - X-rays taken and reviewed  - continue wbat in cam boot  - OTC compression stocking for edema control as needed   - All questions were answered " to the patients satisfaction.  - RTC 4 weeks, repeat radiographs          This document has been electronically signed by Ousmane Silveira DPM on May 21, 2018 11:00 AM     5/21/2018  11:00 AM

## 2018-05-22 ENCOUNTER — ANTICOAGULATION VISIT (OUTPATIENT)
Dept: CARDIAC SURGERY | Facility: CLINIC | Age: 83
End: 2018-05-22

## 2018-05-22 VITALS — SYSTOLIC BLOOD PRESSURE: 110 MMHG | HEART RATE: 76 BPM | DIASTOLIC BLOOD PRESSURE: 60 MMHG

## 2018-05-22 DIAGNOSIS — Z98.890 S/P MVR (MITRAL VALVE REPAIR): ICD-10-CM

## 2018-05-22 DIAGNOSIS — Z79.01 LONG TERM CURRENT USE OF ANTICOAGULANT THERAPY: ICD-10-CM

## 2018-05-22 DIAGNOSIS — I48.91 ATRIAL FIBRILLATION, UNSPECIFIED TYPE (HCC): ICD-10-CM

## 2018-05-22 LAB — INR PPP: 2.4 (ref 0.9–1.1)

## 2018-05-22 PROCEDURE — 85610 PROTHROMBIN TIME: CPT | Performed by: NURSE PRACTITIONER

## 2018-05-22 NOTE — PROGRESS NOTES
Patient states no med changes or bleeding problems or unexplained bruising. Patient instructed to continue current dosing schedule. Verbalizes understanding. Will recheck 1 month. Patient instructed regarding medication; results given and questions answered. Nutritional counseling given.  Dietary factors affecting therapy addressed.  Patient instructed to monitor for excessive bruising or bleeding.          This document has been electronically signed by EBONY Morocho on May 23, 2018 9:10 AM

## 2018-06-19 ENCOUNTER — ANTICOAGULATION VISIT (OUTPATIENT)
Dept: CARDIAC SURGERY | Facility: CLINIC | Age: 83
End: 2018-06-19

## 2018-06-19 ENCOUNTER — OFFICE VISIT (OUTPATIENT)
Dept: PODIATRY | Facility: CLINIC | Age: 83
End: 2018-06-19

## 2018-06-19 VITALS — DIASTOLIC BLOOD PRESSURE: 70 MMHG | SYSTOLIC BLOOD PRESSURE: 110 MMHG | HEART RATE: 80 BPM

## 2018-06-19 VITALS — HEART RATE: 70 BPM | WEIGHT: 193 LBS | HEIGHT: 64 IN | OXYGEN SATURATION: 98 % | BODY MASS INDEX: 32.95 KG/M2

## 2018-06-19 DIAGNOSIS — S82.61XD CLOSED DISPLACED FRACTURE OF LATERAL MALLEOLUS OF RIGHT FIBULA WITH ROUTINE HEALING: Primary | ICD-10-CM

## 2018-06-19 DIAGNOSIS — I48.91 ATRIAL FIBRILLATION, UNSPECIFIED TYPE (HCC): ICD-10-CM

## 2018-06-19 DIAGNOSIS — Z98.890 S/P MVR (MITRAL VALVE REPAIR): ICD-10-CM

## 2018-06-19 DIAGNOSIS — Z79.01 LONG TERM CURRENT USE OF ANTICOAGULANT THERAPY: ICD-10-CM

## 2018-06-19 LAB — INR PPP: 1.7 (ref 0.9–1.1)

## 2018-06-19 PROCEDURE — 99024 POSTOP FOLLOW-UP VISIT: CPT | Performed by: PODIATRIST

## 2018-06-19 PROCEDURE — 99211 OFF/OP EST MAY X REQ PHY/QHP: CPT | Performed by: NURSE PRACTITIONER

## 2018-06-19 PROCEDURE — 85610 PROTHROMBIN TIME: CPT | Performed by: NURSE PRACTITIONER

## 2018-06-19 NOTE — PROGRESS NOTES
Miah Hogan  1935  82 y.o. female     Patient presents today for a recheck of her right ankle with repeat xrays.    06/19/2018     Chief Complaint   Patient presents with   • Right Ankle - Follow-up, fracture care       History of Present Illness    Patient presents to clinic today for follow-up of her right ankle fracture.  Date of injury May 3, 2018.  She is ambulating in a cam boot.  She denies pain.    Past Medical History:   Diagnosis Date   • Atrial fibrillation    • Closed displaced fracture of lateral malleolus of right fibula    • Dizziness and giddiness    • Edema    • Hyperlipidemia    • Hypertension    • Hypothyroidism    • Hypothyroidism    • Long term (current) use of anticoagulants    • Nonrheumatic mitral valve disorder, unspecified          Past Surgical History:   Procedure Laterality Date   • APPENDECTOMY     • KNEE SURGERY Right    • MITRAL VALVE REPLACEMENT           Family History   Problem Relation Age of Onset   • Cancer Mother        Allergies   Allergen Reactions   • Lisinopril Angioedema   • Codeine Sulfate Unknown (See Comments)     Pt is unsure       Social History     Social History   • Marital status:      Spouse name: N/A   • Number of children: N/A   • Years of education: N/A     Occupational History   • Not on file.     Social History Main Topics   • Smoking status: Never Smoker   • Smokeless tobacco: Never Used   • Alcohol use No   • Drug use: No   • Sexual activity: Defer     Other Topics Concern   • Not on file     Social History Narrative   • No narrative on file         Current Outpatient Prescriptions   Medication Sig Dispense Refill   • cholecalciferol (VITAMIN D3) 1000 units tablet Take 2,000 Units by mouth Daily.     • famotidine (PEPCID) 20 MG tablet Take 1 tablet by mouth Daily. (Patient taking differently: Take 20 mg by mouth Daily. Stop 3/29/18) 14 tablet 0   • levothyroxine (SYNTHROID, LEVOTHROID) 88 MCG tablet Take 1 tablet by mouth Daily.  6   •  "metoprolol tartrate (LOPRESSOR) 25 MG tablet Take 1 tablet by mouth Every 12 (Twelve) Hours. 60 tablet 6   • warfarin (COUMADIN) 2.5 MG tablet Take 1 tablet by mouth Daily. (Patient taking differently: Take 2.5 mg by mouth Daily. Take 2.5 mg by mouth on SuMoWeThSa and 5 mg by mouth on TuFr) 30 tablet 6     No current facility-administered medications for this visit.          OBJECTIVE    Pulse 70   Ht 162.6 cm (64\")   Wt 87.5 kg (193 lb)   SpO2 98%   BMI 33.13 kg/m²       Review of Systems   Constitutional: Negative.    HENT: Negative.    Eyes: Negative.    Respiratory: Positive for shortness of breath.    Cardiovascular: Negative.    Gastrointestinal: Negative.    Endocrine: Negative.    Genitourinary: Negative.    Musculoskeletal: Negative.    Skin: Negative.    Allergic/Immunologic: Negative.    Neurological: Negative.    Hematological: Negative.    Psychiatric/Behavioral: Negative.            Physical Exam    Gait: antalgic    Assistive Device: walker and cam boot    Right Lower Extremity    Cardiovascular:    DP/PT pulses palpable    Improved edema and ecchymosis noted diffusely about the right ankle     Musculoskeletal:  Muscle strength deferred  No pain on palpation to the distal lateral malleolus    Dermatological:   Skin is warm, dry and intact    Webspaces 1-4 bilateral are clean, dry and intact.   No subcutaneous nodules or masses noted      Neurological:   Protective sensation intact    Sensation intact to light touch      Radiographs: 3 views of the right ankle were obtained today.  Nondisplaced transverse fracture to the distal lateral malleolus noted.  Ankle mortise is in good alignment.        Procedures        ASSESSMENT AND PLAN    Miah was seen today for follow-up and fracture care.    Diagnoses and all orders for this visit:    Closed displaced fracture of lateral malleolus of right fibula with routine healing  -     XR Ankle 3+ View Right      - X-rays taken and reviewed  - transition to " regular shoe gear with ankle brace  - dispensed lace up ankle brace  - OTC compression stocking for edema control as needed   - All questions were answered to the patients satisfaction.  - RTC 4 weeks, repeat radiographs          This document has been electronically signed by Ousmane Silveira DPM on June 19, 2018 1:53 PM     6/19/2018  1:53 PM

## 2018-06-19 NOTE — PROGRESS NOTES
Pt states she missed one coumadin dose two days ago.  Pt denies maintenance medication changes.  No bleeding issues or new s/s blood clots at this time.  Adjusted coumadin dose and instructed pt to limit green intake for three days.  Recheck INR next month on pt's usual therapeutic dose.  Pt verbalizes.  Patient instructed regarding medication; results given and questions answered. Nutritional counseling given.  Dietary factors affecting therapy addressed.  Patient instructed to monitor for excessive bruising or bleeding.        This document has been electronically signed by DAISY Hoff @ on June 19, 2018 2:30 PM

## 2018-07-17 ENCOUNTER — ANTICOAGULATION VISIT (OUTPATIENT)
Dept: CARDIAC SURGERY | Facility: CLINIC | Age: 83
End: 2018-07-17

## 2018-07-17 ENCOUNTER — OFFICE VISIT (OUTPATIENT)
Dept: PODIATRY | Facility: CLINIC | Age: 83
End: 2018-07-17

## 2018-07-17 VITALS — OXYGEN SATURATION: 90 % | WEIGHT: 193 LBS | HEIGHT: 64 IN | BODY MASS INDEX: 32.95 KG/M2 | HEART RATE: 59 BPM

## 2018-07-17 VITALS — SYSTOLIC BLOOD PRESSURE: 112 MMHG | OXYGEN SATURATION: 96 % | DIASTOLIC BLOOD PRESSURE: 61 MMHG | HEART RATE: 75 BPM

## 2018-07-17 DIAGNOSIS — Z98.890 S/P MVR (MITRAL VALVE REPAIR): ICD-10-CM

## 2018-07-17 DIAGNOSIS — I48.91 ATRIAL FIBRILLATION, UNSPECIFIED TYPE (HCC): ICD-10-CM

## 2018-07-17 DIAGNOSIS — S82.61XD CLOSED DISPLACED FRACTURE OF LATERAL MALLEOLUS OF RIGHT FIBULA WITH ROUTINE HEALING: Primary | ICD-10-CM

## 2018-07-17 DIAGNOSIS — Z79.01 LONG TERM CURRENT USE OF ANTICOAGULANT THERAPY: ICD-10-CM

## 2018-07-17 LAB — INR PPP: 2.9 (ref 0.9–1.1)

## 2018-07-17 PROCEDURE — 85610 PROTHROMBIN TIME: CPT | Performed by: NURSE PRACTITIONER

## 2018-07-17 PROCEDURE — 99024 POSTOP FOLLOW-UP VISIT: CPT | Performed by: PODIATRIST

## 2018-07-17 NOTE — PROGRESS NOTES
Patient states no med changes or bleeding problems or unexplained bruising. Patient instructed to continue current dosing schedule. Verbalizes understanding. Will recheck 1 month.  Patient instructed regarding medication; results given and questions answered. Nutritional counseling given.  Dietary factors affecting therapy addressed.  Patient instructed to monitor for excessive bruising or bleeding.         This document has been electronically signed by EBONY Phoenix on July 17, 2018 2:50 PM

## 2018-07-17 NOTE — PROGRESS NOTES
iMah Hogan  1935  82 y.o. female     Patient presents today for a recheck of her right ankle fracture with repeat xrays. DOI  5/3/18.    07/17/2018     Chief Complaint   Patient presents with   • Right Ankle - Follow-up       History of Present Illness    Patient presents to clinic today for follow-up of her right ankle fracture. She currently denies pain.  She is ambulating in regular shoe gear unassisted.  She denies any new pedal complaints.    Past Medical History:   Diagnosis Date   • Atrial fibrillation (CMS/HCC)    • Closed displaced fracture of lateral malleolus of right fibula    • Dizziness and giddiness    • Edema    • Hyperlipidemia    • Hypertension    • Hypothyroidism    • Hypothyroidism    • Long term (current) use of anticoagulants    • Nonrheumatic mitral valve disorder, unspecified          Past Surgical History:   Procedure Laterality Date   • APPENDECTOMY     • KNEE SURGERY Right    • MITRAL VALVE REPLACEMENT           Family History   Problem Relation Age of Onset   • Cancer Mother        Allergies   Allergen Reactions   • Lisinopril Angioedema   • Codeine Sulfate Unknown (See Comments)     Pt is unsure       Social History     Social History   • Marital status:      Spouse name: N/A   • Number of children: N/A   • Years of education: N/A     Occupational History   • Not on file.     Social History Main Topics   • Smoking status: Never Smoker   • Smokeless tobacco: Never Used   • Alcohol use No   • Drug use: No   • Sexual activity: Defer     Other Topics Concern   • Not on file     Social History Narrative   • No narrative on file         Current Outpatient Prescriptions   Medication Sig Dispense Refill   • cholecalciferol (VITAMIN D3) 1000 units tablet Take 2,000 Units by mouth Daily.     • famotidine (PEPCID) 20 MG tablet Take 1 tablet by mouth Daily. (Patient taking differently: Take 20 mg by mouth Daily. Stop 3/29/18) 14 tablet 0   • levothyroxine (SYNTHROID, LEVOTHROID)  "88 MCG tablet Take 1 tablet by mouth Daily.  6   • metoprolol tartrate (LOPRESSOR) 25 MG tablet Take 1 tablet by mouth Every 12 (Twelve) Hours. 60 tablet 6   • warfarin (COUMADIN) 2.5 MG tablet Take 1 tablet by mouth Daily. (Patient taking differently: Take 2.5 mg by mouth Daily. Take 2.5 mg by mouth on SuMoWeThSa and 5 mg by mouth on TuFr) 30 tablet 6     No current facility-administered medications for this visit.          OBJECTIVE    Pulse 59   Ht 162.6 cm (64\")   Wt 87.5 kg (193 lb)   SpO2 90%   BMI 33.13 kg/m²       Review of Systems   Constitutional: Negative.    HENT: Negative.    Respiratory: Positive for shortness of breath.    Cardiovascular: Negative.    Genitourinary: Negative.    Musculoskeletal: Negative.    Skin: Negative.    Neurological: Negative.    Psychiatric/Behavioral: Negative.            Physical Exam   Constitutional: She is oriented to person, place, and time. She appears well-developed and well-nourished.   HENT:   Head: Normocephalic and atraumatic.   Pulmonary/Chest: Effort normal. No respiratory distress.   Neurological: She is alert and oriented to person, place, and time.   Psychiatric: She has a normal mood and affect. Her behavior is normal.       Gait: normal s    Assistive Device: none    Right Lower Extremity    Cardiovascular:    DP/PT pulses palpable    No edema or ecchymosis noted    Musculoskeletal:  Muscle strength deferred  No pain on palpation to the distal lateral malleolus    Dermatological:   Skin is warm, dry and intact    Webspaces 1-4 bilateral are clean, dry and intact.   No subcutaneous nodules or masses noted      Neurological:   Protective sensation intact    Sensation intact to light touch        Procedures        ASSESSMENT AND PLAN    Miah was seen today for follow-up.    Diagnoses and all orders for this visit:    Closed displaced fracture of lateral malleolus of right fibula with routine healing  -     XR Ankle 3+ View Right      - X-rays taken and " reviewed  - Patient is doing very well and is discharged from care at this time.  - All her questions were answered  - Return to clinic as needed           This document has been electronically signed by Maria Del Carmen Shetty on July 17, 2018 2:01 PM     7/17/2018  2:01 PM

## 2018-08-14 ENCOUNTER — ANTICOAGULATION VISIT (OUTPATIENT)
Dept: CARDIAC SURGERY | Facility: CLINIC | Age: 83
End: 2018-08-14

## 2018-08-14 VITALS — OXYGEN SATURATION: 97 % | HEART RATE: 60 BPM | DIASTOLIC BLOOD PRESSURE: 74 MMHG | SYSTOLIC BLOOD PRESSURE: 124 MMHG

## 2018-08-14 DIAGNOSIS — Z98.890 S/P MVR (MITRAL VALVE REPAIR): ICD-10-CM

## 2018-08-14 DIAGNOSIS — I48.91 ATRIAL FIBRILLATION, UNSPECIFIED TYPE (HCC): ICD-10-CM

## 2018-08-14 DIAGNOSIS — Z79.01 LONG TERM CURRENT USE OF ANTICOAGULANT THERAPY: ICD-10-CM

## 2018-08-14 LAB — INR PPP: 2 (ref 0.9–1.1)

## 2018-08-14 PROCEDURE — 85610 PROTHROMBIN TIME: CPT | Performed by: NURSE PRACTITIONER

## 2018-08-14 NOTE — PROGRESS NOTES
Patient states no med changes or bleeding problems or unexplained bruising. Patient instructed to continue current dosing schedule. Verbalizes understanding. Will recheck 1 month.  Patient instructed regarding medication; results given and questions answered. Nutritional counseling given.  Dietary factors affecting therapy addressed.  Patient instructed to monitor for excessive bruising or bleeding.         This document has been electronically signed by DAISY Hoff @ on August 14, 2018 2:41 PM

## 2018-08-16 ENCOUNTER — DOCUMENTATION (OUTPATIENT)
Dept: CARDIOLOGY | Facility: CLINIC | Age: 83
End: 2018-08-16

## 2018-08-16 RX ORDER — WARFARIN SODIUM 2.5 MG/1
2.5 TABLET ORAL
Qty: 30 TABLET | Refills: 6 | Status: SHIPPED | OUTPATIENT
Start: 2018-08-16 | End: 2019-03-11 | Stop reason: SDUPTHER

## 2018-08-16 NOTE — PROGRESS NOTES
Patient called stating she has called 3 times to get her warfarin filled, and no one had done it for her , I printed off what she needed and took it to the coumadin clinic to get it refilled , since I do not have the authorization to get it filled.

## 2018-09-18 ENCOUNTER — ANTICOAGULATION VISIT (OUTPATIENT)
Dept: CARDIAC SURGERY | Facility: CLINIC | Age: 83
End: 2018-09-18

## 2018-09-18 VITALS — SYSTOLIC BLOOD PRESSURE: 120 MMHG | DIASTOLIC BLOOD PRESSURE: 64 MMHG | HEART RATE: 80 BPM

## 2018-09-18 DIAGNOSIS — I48.91 ATRIAL FIBRILLATION, UNSPECIFIED TYPE (HCC): ICD-10-CM

## 2018-09-18 DIAGNOSIS — Z98.890 S/P MVR (MITRAL VALVE REPAIR): ICD-10-CM

## 2018-09-18 DIAGNOSIS — Z79.01 LONG TERM CURRENT USE OF ANTICOAGULANT THERAPY: ICD-10-CM

## 2018-09-18 LAB — INR PPP: 2.4 (ref 0.9–1.1)

## 2018-09-18 PROCEDURE — 85610 PROTHROMBIN TIME: CPT | Performed by: NURSE PRACTITIONER

## 2018-09-18 NOTE — PROGRESS NOTES
Patient states no med changes or bleeding problems or unexplained bruising. Patient instructed to continue current dosing schedule. Verbalizes understanding. Will recheck 1 month.   Patient instructed regarding medication; results given and questions answered. Nutritional counseling given.  Dietary factors affecting therapy addressed.  Patient instructed to monitor for excessive bruising or bleeding.        This document has been electronically signed by EBONY Phoenix on September 18, 2018 2:52 PM

## 2018-10-11 ENCOUNTER — OFFICE VISIT (OUTPATIENT)
Dept: CARDIOLOGY | Facility: CLINIC | Age: 83
End: 2018-10-11

## 2018-10-11 VITALS
HEIGHT: 64 IN | SYSTOLIC BLOOD PRESSURE: 128 MMHG | BODY MASS INDEX: 32.83 KG/M2 | DIASTOLIC BLOOD PRESSURE: 82 MMHG | HEART RATE: 70 BPM | WEIGHT: 192.3 LBS | OXYGEN SATURATION: 97 %

## 2018-10-11 DIAGNOSIS — Z98.890 S/P MVR (MITRAL VALVE REPAIR): ICD-10-CM

## 2018-10-11 DIAGNOSIS — I48.19 ATRIAL FIBRILLATION, PERSISTENT (HCC): ICD-10-CM

## 2018-10-11 DIAGNOSIS — I10 ESSENTIAL HYPERTENSION: Primary | ICD-10-CM

## 2018-10-11 DIAGNOSIS — E78.2 MIXED HYPERLIPIDEMIA: ICD-10-CM

## 2018-10-11 DIAGNOSIS — Z79.01 LONG TERM CURRENT USE OF ANTICOAGULANT THERAPY: ICD-10-CM

## 2018-10-11 PROCEDURE — 99214 OFFICE O/P EST MOD 30 MIN: CPT | Performed by: INTERNAL MEDICINE

## 2018-10-11 NOTE — PROGRESS NOTES
Miah Hogan  82 y.o. female    10/11/2018  1. Essential hypertension    2. Mixed hyperlipidemia    3. Atrial fibrillation, persistent (CMS/HCC)    4. S/P MVR (mitral valve repair)    5. Long term (current) use of anticoagulants        History of Present Illness    Mrs. Hogan is here for follow-up of above stated problems.  She has done well from a cardiac standpoint with no chest pain, shortness of breath, palpitation, dizziness.  She's been compliant with her medications and her PT and INR are in the therapeutic range.  Her blood pressure was normal.  She has persistent atrial fibrillation which is well rate controlled.  She is known to have elevated LDL of 138 in the past and has had significant intolerance to statins.  She has allergy to ACE inhibitor use causing rash and angioedema.        SUBJECTIVE    Allergies   Allergen Reactions   • Lisinopril Angioedema   • Codeine Sulfate Unknown (See Comments)     Pt is unsure         Past Medical History:   Diagnosis Date   • Atrial fibrillation (CMS/HCC)    • Closed displaced fracture of lateral malleolus of right fibula    • Dizziness and giddiness    • Edema    • Hyperlipidemia    • Hypertension    • Hypothyroidism    • Hypothyroidism    • Long term (current) use of anticoagulants    • Nonrheumatic mitral valve disorder, unspecified          Past Surgical History:   Procedure Laterality Date   • APPENDECTOMY     • KNEE SURGERY Right    • MITRAL VALVE REPLACEMENT           Family History   Problem Relation Age of Onset   • Cancer Mother    • Breast cancer Mother          Social History     Social History   • Marital status:      Spouse name: N/A   • Number of children: N/A   • Years of education: N/A     Occupational History   • Not on file.     Social History Main Topics   • Smoking status: Never Smoker   • Smokeless tobacco: Never Used   • Alcohol use No   • Drug use: No   • Sexual activity: Defer     Other Topics Concern   • Not on file     Social  "History Narrative   • No narrative on file         Current Outpatient Prescriptions   Medication Sig Dispense Refill   • cholecalciferol (VITAMIN D3) 1000 units tablet Take 2,000 Units by mouth Daily.     • levothyroxine (SYNTHROID, LEVOTHROID) 88 MCG tablet Take 1 tablet by mouth Daily.  6   • metoprolol tartrate (LOPRESSOR) 25 MG tablet Take 1 tablet by mouth Every 12 (Twelve) Hours. 60 tablet 6   • warfarin (COUMADIN) 2.5 MG tablet Take 1 tablet by mouth Daily. 30 tablet 6     No current facility-administered medications for this visit.          OBJECTIVE    /82   Pulse 70   Ht 162.6 cm (64.02\")   Wt 87.2 kg (192 lb 4.8 oz)   SpO2 97%   BMI 32.99 kg/m²         Review of Systems     Constitutional:  Denies recent weight loss, weight gain, fever or chills.  Fatigue     HENT:  Denies any hearing loss, epistaxis, hoarseness, or difficulty speaking.     Eyes: Wears eyeglasses or contact lenses     Respiratory:  Denies dyspnea with exertion,no cough, wheezing, or hemoptysis.     Cardiovascular: Negative for palpations, chest pain,     Gastrointestinal:  Denies change in bowel habits, dyspepsia, ulcer disease, hematochezia, or melena.     Endocrine: Negative for cold intolerance, heat intolerance, polydipsia, polyphagia and polyuria.  Hypothyroidism    Genitourinary: Negative.      Musculoskeletal: Denies any history of arthritic symptoms or back problems     Skin:  Denies any change in hair or nails, rashes, or skin lesions.     Allergic/Immunologic: Negative.  Negative for environmental allergies, food allergies and immunocompromised state.     Neurological:  Denies any history of recurrent headaches, strokes, TIA, or seizure disorder.     Hematological: Denies any food allergies, seasonal allergies, bleeding disorders, or lymphadenopathy.     Psychiatric/Behavioral: Denies any history of depression, substance abuse, or change in cognitive function.         Physical Exam     Constitutional: Cooperative, " alert and oriented,  in no acute distress.     HENT:   Head: Normocephalic, normal hair patterns, no masses or tenderness.  Ears, Nose, and Throat: No gross abnormalities. No pallor or cyanosis.   Eyes: EOMS intact, PERRL, conjunctivae and lids unremarkable. Fundoscopic exam and visual fields not performed.   Neck: No palpable masses or adenopathy, no thyromegaly, no JVD, carotid pulses are full and equal bilaterally and without  Bruits.     Cardiovascular: Irregular rhythm, normal prosthetic valve sounds, no S3 or S4. Apical impulse not displaced. No murmurs, gallops, or rubs detected.     Pulmonary/Chest: Chest: normal symmetry,  normal respiratory excursion, no intercostal retraction, no use of accessory muscles.            Pulmonary: Normal breath sounds. No rales or ronchi.    Abdominal: Abdomen soft, bowel sounds normoactive, no masses, no hepatosplenomegaly, non-tender, no bruits.     Musculoskeletal: No deformities, clubbing, cyanosis, erythema, or edema observed.     Neurological: No gross motor or sensory deficits noted, affect appropriate, oriented to time, person, place.     Skin: Warm and dry to the touch, no apparent skin lesions or masses noted.     Psychiatric: She has a normal mood and affect. Her behavior is normal. Judgment and thought content normal.         Procedures      Lab Results   Component Value Date    WBC 10.02 (H) 03/16/2018    HGB 13.8 03/16/2018    HCT 41.1 03/16/2018    MCV 91.7 03/16/2018     03/16/2018     Lab Results   Component Value Date    GLUCOSE 113 (H) 03/16/2018    BUN 23 (H) 03/16/2018    CREATININE 0.78 03/16/2018    EGFRIFNONA 71 03/16/2018    BCR 29.5 (H) 03/16/2018    CO2 28.0 03/16/2018    CALCIUM 9.3 03/16/2018    ALBUMIN 3.50 03/16/2018    AST 21 03/16/2018    ALT 31 03/16/2018     Lab Results   Component Value Date    CHOL 234 (H) 01/27/2017     Lab Results   Component Value Date    TRIG 199 01/27/2017    TRIG 190 09/01/2015     Lab Results   Component  Value Date    HDL 52 (L) 01/27/2017     No components found for: LDLCALC  Lab Results   Component Value Date     (H) 01/27/2017     09/01/2015     No results found for: HDLLDLRATIO  No components found for: CHOLHDL  No results found for: HGBA1C  Lab Results   Component Value Date    TSH 0.080 (L) 03/16/2018           ASSESSMENT AND PLAN  Mrs. Hogan is stable with regards to her heart with no definite evidence of angina, congestive heart failure.  Her mechanical prosthetic valve is functioning well.  I've continued metoprolol tartrate and anticoagulation with warfarin.  She will have her lipid profiles checked by her primary care physician.  If the LDL is elevated Zetia could be considered.  I did discuss PCSK9 inhibitors.  This will also be an option.    Miah was seen today for follow-up and fatigue.    Diagnoses and all orders for this visit:    Essential hypertension    Mixed hyperlipidemia    Atrial fibrillation, persistent (CMS/HCC)    S/P MVR (mitral valve repair)    Long term (current) use of anticoagulants        Kg Adams MD  10/11/2018  3:01 PM

## 2018-10-23 ENCOUNTER — ANTICOAGULATION VISIT (OUTPATIENT)
Dept: CARDIAC SURGERY | Facility: CLINIC | Age: 83
End: 2018-10-23

## 2018-10-23 VITALS — DIASTOLIC BLOOD PRESSURE: 70 MMHG | SYSTOLIC BLOOD PRESSURE: 110 MMHG | HEART RATE: 80 BPM

## 2018-10-23 DIAGNOSIS — Z98.890 S/P MVR (MITRAL VALVE REPAIR): ICD-10-CM

## 2018-10-23 DIAGNOSIS — I48.91 ATRIAL FIBRILLATION, UNSPECIFIED TYPE (HCC): ICD-10-CM

## 2018-10-23 DIAGNOSIS — Z79.01 LONG TERM CURRENT USE OF ANTICOAGULANT THERAPY: ICD-10-CM

## 2018-10-23 LAB — INR PPP: 2.4 (ref 0.9–1.1)

## 2018-10-23 PROCEDURE — 85610 PROTHROMBIN TIME: CPT | Performed by: NURSE PRACTITIONER

## 2018-10-23 NOTE — PROGRESS NOTES
Patient states no med changes or bleeding problems or unexplained bruising. Patient instructed to continue current dosing schedule. Verbalizes understanding. Will recheck 1 month.   Patient instructed regarding medication; results given and questions answered. Nutritional counseling given.  Dietary factors affecting therapy addressed.  Patient instructed to monitor for excessive bruising or bleeding.        This document has been electronically signed by DAISY Hoff @ on October 23, 2018 2:57 PM

## 2018-11-29 ENCOUNTER — ANTICOAGULATION VISIT (OUTPATIENT)
Dept: CARDIAC SURGERY | Facility: CLINIC | Age: 83
End: 2018-11-29

## 2018-11-29 VITALS — OXYGEN SATURATION: 98 % | HEART RATE: 77 BPM | SYSTOLIC BLOOD PRESSURE: 122 MMHG | DIASTOLIC BLOOD PRESSURE: 72 MMHG

## 2018-11-29 DIAGNOSIS — Z98.890 S/P MVR (MITRAL VALVE REPAIR): ICD-10-CM

## 2018-11-29 DIAGNOSIS — Z79.01 LONG TERM CURRENT USE OF ANTICOAGULANT THERAPY: ICD-10-CM

## 2018-11-29 DIAGNOSIS — I48.91 ATRIAL FIBRILLATION, UNSPECIFIED TYPE (HCC): ICD-10-CM

## 2018-11-29 LAB — INR PPP: 3 (ref 0.9–1.1)

## 2018-11-29 PROCEDURE — 85610 PROTHROMBIN TIME: CPT | Performed by: NURSE PRACTITIONER

## 2018-11-29 NOTE — PROGRESS NOTES
PT states compliant c tx. PT denies any new medications or bleeding issues. PT denies any s/s of blood clot. PT instructed to continue same dose and Coumadin friendly diet. PT will be seen in 1 month. Patient instructed regarding medication; results given and questions answered. Nutritional counseling given.  Dietary factors affecting therapy addressed.  Patient instructed to monitor for excessive bruising or bleeding. PT verbalizes understanding.         This document has been electronically signed by EBONY Phoenix on November 29, 2018 11:26 AM

## 2018-12-28 ENCOUNTER — ANTICOAGULATION VISIT (OUTPATIENT)
Dept: CARDIAC SURGERY | Facility: CLINIC | Age: 83
End: 2018-12-28

## 2018-12-28 VITALS — HEART RATE: 80 BPM | SYSTOLIC BLOOD PRESSURE: 110 MMHG | DIASTOLIC BLOOD PRESSURE: 76 MMHG

## 2018-12-28 DIAGNOSIS — I48.91 ATRIAL FIBRILLATION, UNSPECIFIED TYPE (HCC): ICD-10-CM

## 2018-12-28 DIAGNOSIS — Z79.01 LONG TERM CURRENT USE OF ANTICOAGULANT THERAPY: ICD-10-CM

## 2018-12-28 DIAGNOSIS — Z98.890 S/P MVR (MITRAL VALVE REPAIR): ICD-10-CM

## 2018-12-28 LAB — INR PPP: 2.3 (ref 0.9–1.1)

## 2018-12-28 PROCEDURE — 85610 PROTHROMBIN TIME: CPT | Performed by: NURSE PRACTITIONER

## 2019-01-29 ENCOUNTER — ANTICOAGULATION VISIT (OUTPATIENT)
Dept: CARDIAC SURGERY | Facility: CLINIC | Age: 84
End: 2019-01-29

## 2019-01-29 VITALS — DIASTOLIC BLOOD PRESSURE: 76 MMHG | HEART RATE: 78 BPM | SYSTOLIC BLOOD PRESSURE: 132 MMHG

## 2019-01-29 DIAGNOSIS — I48.91 ATRIAL FIBRILLATION, UNSPECIFIED TYPE (HCC): ICD-10-CM

## 2019-01-29 DIAGNOSIS — Z98.890 S/P MVR (MITRAL VALVE REPAIR): ICD-10-CM

## 2019-01-29 DIAGNOSIS — Z79.01 LONG TERM CURRENT USE OF ANTICOAGULANT THERAPY: ICD-10-CM

## 2019-01-29 LAB — INR PPP: 1.9 (ref 0.9–1.1)

## 2019-01-29 PROCEDURE — 85610 PROTHROMBIN TIME: CPT | Performed by: NURSE PRACTITIONER

## 2019-01-29 NOTE — PROGRESS NOTES
Pt denies missed coumadin doses.  Pt states she may have consumed too much green lately.  Will leave coumadin dose the same since pt is usually therapeutic.  Instructed pt to limit green intake for two days.  Recheck INR in one month.  Pt verbalizes.  Patient instructed regarding medication; results given and questions answered. Nutritional counseling given.  Dietary factors affecting therapy addressed.  Patient instructed to monitor for excessive bruising or bleeding.        This document has been electronically signed by Coby Marinelli, DAISY @ on January 29, 2019 2:55 PM

## 2019-02-25 ENCOUNTER — TELEPHONE (OUTPATIENT)
Dept: URGENT CARE | Facility: CLINIC | Age: 84
End: 2019-02-25

## 2019-02-26 ENCOUNTER — DOCUMENTATION (OUTPATIENT)
Dept: CARDIAC SURGERY | Facility: CLINIC | Age: 84
End: 2019-02-26

## 2019-02-26 ENCOUNTER — HOSPITAL ENCOUNTER (EMERGENCY)
Facility: HOSPITAL | Age: 84
Discharge: HOME | End: 2019-02-26
Attending: EMERGENCY MEDICINE | Admitting: EMERGENCY MEDICINE

## 2019-02-26 VITALS
DIASTOLIC BLOOD PRESSURE: 59 MMHG | HEART RATE: 83 BPM | BODY MASS INDEX: 29.4 KG/M2 | RESPIRATION RATE: 18 BRPM | WEIGHT: 194 LBS | HEIGHT: 68 IN | TEMPERATURE: 98.5 F | OXYGEN SATURATION: 95 % | SYSTOLIC BLOOD PRESSURE: 108 MMHG

## 2019-02-26 DIAGNOSIS — T78.40XA ALLERGIC REACTION, INITIAL ENCOUNTER: Primary | ICD-10-CM

## 2019-02-26 PROCEDURE — 99283 EMERGENCY DEPT VISIT LOW MDM: CPT

## 2019-02-26 PROCEDURE — 96375 TX/PRO/DX INJ NEW DRUG ADDON: CPT

## 2019-02-26 PROCEDURE — 25010000002 DIPHENHYDRAMINE PER 50 MG: Performed by: STUDENT IN AN ORGANIZED HEALTH CARE EDUCATION/TRAINING PROGRAM

## 2019-02-26 PROCEDURE — 25010000002 METHYLPREDNISOLONE PER 125 MG: Performed by: STUDENT IN AN ORGANIZED HEALTH CARE EDUCATION/TRAINING PROGRAM

## 2019-02-26 PROCEDURE — 96374 THER/PROPH/DIAG INJ IV PUSH: CPT

## 2019-02-26 RX ORDER — FAMOTIDINE 10 MG/ML
20 INJECTION, SOLUTION INTRAVENOUS EVERY 12 HOURS SCHEDULED
Status: DISCONTINUED | OUTPATIENT
Start: 2019-02-26 | End: 2019-02-26 | Stop reason: HOSPADM

## 2019-02-26 RX ORDER — SODIUM CHLORIDE 0.9 % (FLUSH) 0.9 %
10 SYRINGE (ML) INJECTION AS NEEDED
Status: DISCONTINUED | OUTPATIENT
Start: 2019-02-26 | End: 2019-02-26 | Stop reason: HOSPADM

## 2019-02-26 RX ORDER — PREDNISONE 20 MG/1
40 TABLET ORAL DAILY
Qty: 10 TABLET | Refills: 0 | Status: SHIPPED | OUTPATIENT
Start: 2019-02-26 | End: 2019-03-02

## 2019-02-26 RX ORDER — DIPHENHYDRAMINE HYDROCHLORIDE 50 MG/ML
25 INJECTION INTRAMUSCULAR; INTRAVENOUS ONCE
Status: COMPLETED | OUTPATIENT
Start: 2019-02-26 | End: 2019-02-26

## 2019-02-26 RX ORDER — METHYLPREDNISOLONE SODIUM SUCCINATE 125 MG/2ML
125 INJECTION, POWDER, LYOPHILIZED, FOR SOLUTION INTRAMUSCULAR; INTRAVENOUS ONCE
Status: COMPLETED | OUTPATIENT
Start: 2019-02-26 | End: 2019-02-26

## 2019-02-26 RX ORDER — RANITIDINE 300 MG/1
300 TABLET ORAL NIGHTLY
Qty: 7 TABLET | Refills: 0 | Status: SHIPPED | OUTPATIENT
Start: 2019-02-26 | End: 2019-04-22

## 2019-02-26 RX ADMIN — FAMOTIDINE 20 MG: 10 INJECTION INTRAVENOUS at 10:51

## 2019-02-26 RX ADMIN — METHYLPREDNISOLONE SODIUM SUCCINATE 125 MG: 125 INJECTION, POWDER, FOR SOLUTION INTRAMUSCULAR; INTRAVENOUS at 10:51

## 2019-02-26 RX ADMIN — DIPHENHYDRAMINE HYDROCHLORIDE 25 MG: 50 INJECTION INTRAMUSCULAR; INTRAVENOUS at 10:51

## 2019-03-11 RX ORDER — WARFARIN SODIUM 2.5 MG/1
2.5 TABLET ORAL
Qty: 30 TABLET | Refills: 6 | Status: SHIPPED | OUTPATIENT
Start: 2019-03-11 | End: 2019-04-23 | Stop reason: SDUPTHER

## 2019-03-19 ENCOUNTER — ANTICOAGULATION VISIT (OUTPATIENT)
Dept: CARDIAC SURGERY | Facility: CLINIC | Age: 84
End: 2019-03-19

## 2019-03-19 VITALS — DIASTOLIC BLOOD PRESSURE: 76 MMHG | SYSTOLIC BLOOD PRESSURE: 124 MMHG | HEART RATE: 76 BPM

## 2019-03-19 DIAGNOSIS — I48.91 ATRIAL FIBRILLATION, UNSPECIFIED TYPE (HCC): ICD-10-CM

## 2019-03-19 DIAGNOSIS — Z98.890 S/P MVR (MITRAL VALVE REPAIR): ICD-10-CM

## 2019-03-19 DIAGNOSIS — Z79.01 LONG TERM CURRENT USE OF ANTICOAGULANT THERAPY: ICD-10-CM

## 2019-03-19 LAB — INR PPP: 2.4 (ref 0.9–1.1)

## 2019-03-19 PROCEDURE — 85610 PROTHROMBIN TIME: CPT | Performed by: NURSE PRACTITIONER

## 2019-03-19 NOTE — PROGRESS NOTES
Today's INR is 2.4.   Patient states no med changes or bleeding problems or unexplained bruising. Patient instructed to continue current dosing schedule. Verbalizes understanding. Will recheck 1 month.   Patient instructed regarding medication; results given and questions answered. Nutritional counseling given.  Dietary factors affecting therapy addressed.  Patient instructed to monitor for excessive bruising or bleeding.        This document has been electronically signed by DAISY Hoff @ on March 19, 2019 2:46 PM

## 2019-04-22 ENCOUNTER — OFFICE VISIT (OUTPATIENT)
Dept: CARDIOLOGY | Facility: CLINIC | Age: 84
End: 2019-04-22

## 2019-04-22 VITALS
DIASTOLIC BLOOD PRESSURE: 68 MMHG | OXYGEN SATURATION: 96 % | BODY MASS INDEX: 31.03 KG/M2 | HEART RATE: 81 BPM | SYSTOLIC BLOOD PRESSURE: 124 MMHG | WEIGHT: 193.1 LBS | HEIGHT: 66 IN

## 2019-04-22 DIAGNOSIS — E78.2 MIXED HYPERLIPIDEMIA: ICD-10-CM

## 2019-04-22 DIAGNOSIS — Z98.890 S/P MVR (MITRAL VALVE REPAIR): ICD-10-CM

## 2019-04-22 DIAGNOSIS — Z79.01 LONG TERM CURRENT USE OF ANTICOAGULANT THERAPY: ICD-10-CM

## 2019-04-22 DIAGNOSIS — I10 ESSENTIAL HYPERTENSION: Primary | ICD-10-CM

## 2019-04-22 DIAGNOSIS — I48.19 ATRIAL FIBRILLATION, PERSISTENT (HCC): ICD-10-CM

## 2019-04-22 PROCEDURE — 99214 OFFICE O/P EST MOD 30 MIN: CPT | Performed by: INTERNAL MEDICINE

## 2019-04-22 NOTE — PROGRESS NOTES
Miah Hogan  83 y.o. female    04/22/2019  1. Essential hypertension    2. Mixed hyperlipidemia    3. Atrial fibrillation, persistent (CMS/HCC)    4. S/P MVR (mitral valve repair)    5. Long term (current) use of anticoagulants        History of Present Illness    Mrs. Hogan is here for follow-up of above stated problems.  She has done well from a cardiac standpoint with no chest pain, shortness of breath, palpitation, dizziness.  She's been compliant with her medications and her PT and INR are in the therapeutic range.  Her blood pressure was normal.  She has persistent atrial fibrillation which is well rate controlled.  She is known to have elevated LDL.  She has had intolerance to statins and does not wish to consider PCSK9 inhibitors. She has allergy to ACE inhibitor use causing rash and angioedema.    She underwent mitral valve replacement in 2003 at [facility] in [place].  She has history of hypertension, hypothyroidism        SUBJECTIVE    Allergies   Allergen Reactions   • Macrobid [Nitrofurantoin] Swelling and Rash   • Codeine Sulfate Unknown (See Comments)     Pt is unsure   • Macrobid [Nitrofurantoin Macrocrystal] Rash         Past Medical History:   Diagnosis Date   • Atrial fibrillation (CMS/HCC)    • Closed displaced fracture of lateral malleolus of right fibula    • Dizziness and giddiness    • Edema    • Hyperlipidemia    • Hypertension    • Hypothyroidism    • Hypothyroidism    • Long term (current) use of anticoagulants    • Nonrheumatic mitral valve disorder, unspecified          Past Surgical History:   Procedure Laterality Date   • APPENDECTOMY     • KNEE SURGERY Right    • MITRAL VALVE REPLACEMENT           Family History   Problem Relation Age of Onset   • Cancer Mother    • Breast cancer Mother          Social History     Socioeconomic History   • Marital status:      Spouse name: Not on file   • Number of children: Not on file   • Years of education: Not on file  "  • Highest education level: Not on file   Tobacco Use   • Smoking status: Never Smoker   • Smokeless tobacco: Never Used   Substance and Sexual Activity   • Alcohol use: No   • Drug use: No   • Sexual activity: Defer         Current Outpatient Medications   Medication Sig Dispense Refill   • cholecalciferol (VITAMIN D3) 1000 units tablet Take 2,000 Units by mouth Daily.     • diphenhydrAMINE (BENADRYL) 25 mg capsule Take 25 mg by mouth Every 6 (Six) Hours As Needed for Itching.     • metoprolol tartrate (LOPRESSOR) 25 MG tablet Take 1 tablet by mouth Every 12 (Twelve) Hours. 60 tablet 6   • warfarin (COUMADIN) 2.5 MG tablet Take 1 tablet by mouth Daily. 30 tablet 6     No current facility-administered medications for this visit.          OBJECTIVE    /68 (BP Location: Right arm, Patient Position: Sitting, Cuff Size: Adult)   Pulse 81   Ht 167.6 cm (65.98\")   Wt 87.6 kg (193 lb 1.6 oz)   SpO2 96%   BMI 31.18 kg/m²         Review of Systems     Constitutional:  Denies recent weight loss, weight gain, fever or chills, no change in exercise tolerance     HENT:  Denies any hearing loss, epistaxis, hoarseness, or difficulty speaking.     Eyes: Wears eyeglasses or contact lenses     Respiratory:  Denies dyspnea with exertion,no cough, wheezing, or hemoptysis.     Cardiovascular: Negative for palpations, chest pain, orthopnea, PND, peripheral edema, syncope, or claudication.     Gastrointestinal:  Denies change in bowel habits, dyspepsia, ulcer disease, hematochezia, or melena.     Endocrine: Negative for cold intolerance, heat intolerance, polydipsia, polyphagia and polyuria.     Genitourinary: Negative.      Musculoskeletal: Denies any history of arthritic symptoms or back problems     Skin:  Denies any change in hair or nails, rashes, or skin lesions.     Allergic/Immunologic: Negative.  Negative for environmental allergies, food allergies and immunocompromised state.     Neurological:  Denies any history of " recurrent headaches, strokes, TIA, or seizure disorder.     Hematological: Denies any food allergies, seasonal allergies, bleeding disorders, or lymphadenopathy.     Psychiatric/Behavioral: Denies any history of depression, substance abuse, or change in cognitive function.         Physical Exam     Constitutional: Cooperative, alert and oriented,  in no acute distress.     HENT:   Head: Normocephalic, normal hair patterns, no masses or tenderness.  Ears, Nose, and Throat: No gross abnormalities. No pallor or cyanosis.   Eyes: EOMS intact, PERRL, conjunctivae and lids unremarkable. Fundoscopic exam and visual fields not performed.   Neck: No palpable masses or adenopathy, no thyromegaly, no JVD, carotid pulses are full and equal bilaterally and without  Bruits.     Cardiovascular: Irregular rhythm, prosthetic valve sounds normal, no S3 or S4.  No murmurs, gallops, or rubs detected.     Pulmonary/Chest: Chest: normal symmetry,  normal respiratory excursion, no intercostal retraction, no use of accessory muscles.            Pulmonary: Normal breath sounds. No rales or ronchi.    Abdominal: Abdomen soft, bowel sounds normoactive, no masses, no hepatosplenomegaly, non-tender, no bruits.     Musculoskeletal: No deformities, clubbing, cyanosis, erythema, or edema observed. There are no spinal abnormalities noted.     Neurological: No gross motor or sensory deficits noted, affect appropriate, oriented to time, person, place.     Skin: Warm and dry to the touch, no apparent skin lesions or masses noted.     Psychiatric: She has a normal mood and affect. Her behavior is normal. Judgment and thought content normal.         Procedures      Lab Results   Component Value Date    WBC 10.02 (H) 03/16/2018    HGB 13.8 03/16/2018    HCT 41.1 03/16/2018    MCV 91.7 03/16/2018     03/16/2018     Lab Results   Component Value Date    GLUCOSE 113 (H) 03/16/2018    BUN 23 (H) 03/16/2018    CREATININE 0.78 03/16/2018    EGFRIFNONA  71 03/16/2018    BCR 29.5 (H) 03/16/2018    CO2 28.0 03/16/2018    CALCIUM 9.3 03/16/2018    ALBUMIN 3.50 03/16/2018    AST 21 03/16/2018    ALT 31 03/16/2018     Lab Results   Component Value Date    CHOL 234 (H) 01/27/2017     Lab Results   Component Value Date    TRIG 199 01/27/2017    TRIG 190 09/01/2015     Lab Results   Component Value Date    HDL 52 (L) 01/27/2017     No components found for: LDLCALC  Lab Results   Component Value Date     (H) 01/27/2017     09/01/2015     No results found for: HDLLDLRATIO  No components found for: CHOLHDL  No results found for: HGBA1C  Lab Results   Component Value Date    TSH 0.080 (L) 03/16/2018           ASSESSMENT AND PLAN  Mrs. Hogan is stable with regards to her heart and is well rate controlled with underlying atrial fibrillation.  Her blood pressure is in the normal range.  I have continued metoprolol tartrate and anticoagulation with warfarin.  Her PT and INR are in the therapeutic range.    Miah was seen today for follow-up.    Diagnoses and all orders for this visit:    Essential hypertension    Mixed hyperlipidemia    Atrial fibrillation, persistent (CMS/HCC)    S/P MVR (mitral valve repair)    Long term (current) use of anticoagulants        Patient's Body mass index is 31.18 kg/m². BMI is above normal parameters. Recommendations include: exercise counseling and nutrition counseling.        Kg Adams MD  4/22/2019  3:42 PM

## 2019-04-23 ENCOUNTER — ANTICOAGULATION VISIT (OUTPATIENT)
Dept: CARDIAC SURGERY | Facility: CLINIC | Age: 84
End: 2019-04-23

## 2019-04-23 VITALS — SYSTOLIC BLOOD PRESSURE: 126 MMHG | HEART RATE: 84 BPM | DIASTOLIC BLOOD PRESSURE: 74 MMHG

## 2019-04-23 DIAGNOSIS — Z79.01 LONG TERM CURRENT USE OF ANTICOAGULANT THERAPY: ICD-10-CM

## 2019-04-23 DIAGNOSIS — I48.91 ATRIAL FIBRILLATION, UNSPECIFIED TYPE (HCC): ICD-10-CM

## 2019-04-23 DIAGNOSIS — Z98.890 S/P MVR (MITRAL VALVE REPAIR): ICD-10-CM

## 2019-04-23 LAB — INR PPP: 1.7 (ref 0.9–1.1)

## 2019-04-23 PROCEDURE — 85610 PROTHROMBIN TIME: CPT | Performed by: NURSE PRACTITIONER

## 2019-04-23 PROCEDURE — 99211 OFF/OP EST MAY X REQ PHY/QHP: CPT | Performed by: NURSE PRACTITIONER

## 2019-04-23 RX ORDER — WARFARIN SODIUM 2.5 MG/1
2.5 TABLET ORAL
Qty: 30 TABLET | Refills: 3 | Status: SHIPPED | OUTPATIENT
Start: 2019-04-23 | End: 2020-01-30

## 2019-04-23 RX ORDER — WARFARIN SODIUM 2.5 MG/1
2.5 TABLET ORAL
Qty: 30 TABLET | Refills: 6 | OUTPATIENT
Start: 2019-04-23

## 2019-04-23 NOTE — PROGRESS NOTES
Today's INR is 1.7.   Pt states she did miss one coumadin dose this weekend.  Pt denies other med changes.  Adjusted coumadin dose for today only and instructed pt to limit green intake for three days.  Recehck INR in two wks, if therapeutic then, place out for one month.  Pt verbalizes.  Patient instructed regarding medication; results given and questions answered. Nutritional counseling given.  Dietary factors affecting therapy addressed.  Patient instructed to monitor for excessive bruising or bleeding.        This document has been electronically signed by Coby Marinelli, DAISY @ on April 23, 2019 2:42 PM

## 2019-04-29 ENCOUNTER — DOCUMENTATION (OUTPATIENT)
Dept: CARDIAC SURGERY | Facility: CLINIC | Age: 84
End: 2019-04-29

## 2019-04-29 NOTE — PROGRESS NOTES
PT called and states she has been ill and missed Friday and Sunday's dose. PT instructed to take 1.5 tabs tonight then resume usual dose. PT will keep appt next week. PT verbalizes understanding.

## 2019-05-07 ENCOUNTER — ANTICOAGULATION VISIT (OUTPATIENT)
Dept: CARDIAC SURGERY | Facility: CLINIC | Age: 84
End: 2019-05-07

## 2019-05-07 VITALS — SYSTOLIC BLOOD PRESSURE: 114 MMHG | OXYGEN SATURATION: 98 % | DIASTOLIC BLOOD PRESSURE: 65 MMHG | HEART RATE: 69 BPM

## 2019-05-07 DIAGNOSIS — I48.91 ATRIAL FIBRILLATION, UNSPECIFIED TYPE (HCC): ICD-10-CM

## 2019-05-07 DIAGNOSIS — Z79.01 LONG TERM CURRENT USE OF ANTICOAGULANT THERAPY: ICD-10-CM

## 2019-05-07 DIAGNOSIS — Z98.890 S/P MVR (MITRAL VALVE REPAIR): ICD-10-CM

## 2019-05-07 LAB — INR PPP: 2.1 (ref 0.9–1.1)

## 2019-05-07 PROCEDURE — 85610 PROTHROMBIN TIME: CPT | Performed by: NURSE PRACTITIONER

## 2019-05-07 NOTE — PROGRESS NOTES
Today's INR is 2.1.  Patient states no med changes or bleeding problems or unexplained bruising. Patient instructed to continue current dosing schedule. Verbalizes understanding. Will recheck 1 month.  Patient instructed regarding medication; results given and questions answered. Nutritional counseling given.  Dietary factors affecting therapy addressed.  Patient instructed to monitor for excessive bruising or bleeding.         This document has been electronically signed by DAISY Hoff @ on May 7, 2019 3:12 PM

## 2019-06-04 ENCOUNTER — DOCUMENTATION (OUTPATIENT)
Dept: CARDIAC SURGERY | Facility: CLINIC | Age: 84
End: 2019-06-04

## 2019-06-18 ENCOUNTER — ANTICOAGULATION VISIT (OUTPATIENT)
Dept: CARDIAC SURGERY | Facility: CLINIC | Age: 84
End: 2019-06-18

## 2019-06-18 VITALS — DIASTOLIC BLOOD PRESSURE: 58 MMHG | HEART RATE: 67 BPM | OXYGEN SATURATION: 96 % | SYSTOLIC BLOOD PRESSURE: 101 MMHG

## 2019-06-18 DIAGNOSIS — Z79.01 LONG TERM CURRENT USE OF ANTICOAGULANT THERAPY: ICD-10-CM

## 2019-06-18 DIAGNOSIS — Z98.890 S/P MVR (MITRAL VALVE REPAIR): ICD-10-CM

## 2019-06-18 DIAGNOSIS — I48.91 ATRIAL FIBRILLATION, UNSPECIFIED TYPE (HCC): ICD-10-CM

## 2019-06-18 LAB — INR PPP: 1.8 (ref 0.9–1.1)

## 2019-06-18 PROCEDURE — 85610 PROTHROMBIN TIME: CPT | Performed by: NURSE PRACTITIONER

## 2019-06-18 PROCEDURE — 99211 OFF/OP EST MAY X REQ PHY/QHP: CPT | Performed by: NURSE PRACTITIONER

## 2019-06-18 NOTE — PROGRESS NOTES
Today's INR is 1.8. Pt denies med changes or bleeding problems. Pt did state she missed a dose one day last week but took it early the next morning and took that days dose later in the day than usual. Pt was instructed if she misses a dose and doesn't realize until the next day to just skip that dose; pt verbalized an understanding. Dose adjusted today only and pt instructed to hold green veggies for 3 days; pt verbalized. Patient instructed regarding medication; results given and questions answered. Nutritional counseling given.  Dietary factors affecting therapy addressed.  Patient instructed to monitor for excessive bruising or bleeding. Will recheck in 2 weeks.         This document has been electronically signed by Coby Marinelli, DAISY @ on June 18, 2019 3:27 PM

## 2019-07-01 ENCOUNTER — ANTICOAGULATION VISIT (OUTPATIENT)
Dept: CARDIAC SURGERY | Facility: CLINIC | Age: 84
End: 2019-07-01

## 2019-07-01 VITALS — HEART RATE: 64 BPM | OXYGEN SATURATION: 97 % | SYSTOLIC BLOOD PRESSURE: 127 MMHG | DIASTOLIC BLOOD PRESSURE: 67 MMHG

## 2019-07-01 DIAGNOSIS — I48.91 ATRIAL FIBRILLATION, UNSPECIFIED TYPE (HCC): ICD-10-CM

## 2019-07-01 DIAGNOSIS — Z98.890 S/P MVR (MITRAL VALVE REPAIR): ICD-10-CM

## 2019-07-01 DIAGNOSIS — Z79.01 LONG TERM CURRENT USE OF ANTICOAGULANT THERAPY: ICD-10-CM

## 2019-07-01 LAB — INR PPP: 1.7 (ref 0.9–1.1)

## 2019-07-01 PROCEDURE — 99211 OFF/OP EST MAY X REQ PHY/QHP: CPT | Performed by: NURSE PRACTITIONER

## 2019-07-01 PROCEDURE — 85610 PROTHROMBIN TIME: CPT | Performed by: NURSE PRACTITIONER

## 2019-07-01 NOTE — PROGRESS NOTES
Today's INR is 1.7.  Pt denies med changes, bleeding problems, missed doses, or excessive vitamin K intake. Dose adjusted and pt instructed to hold green veggies for 3 days; pt verbalized. Patient instructed regarding medication; results given and questions answered. Nutritional counseling given.  Dietary factors affecting therapy addressed.  Patient instructed to monitor for excessive bruising or bleeding. Will recheck next week.      This document has been electronically signed by KAREN Freitas-BC Kristofer  On July 1, 2019 3:28 PM

## 2019-07-12 ENCOUNTER — ANTICOAGULATION VISIT (OUTPATIENT)
Dept: CARDIAC SURGERY | Facility: CLINIC | Age: 84
End: 2019-07-12

## 2019-07-12 VITALS — OXYGEN SATURATION: 97 % | HEART RATE: 63 BPM

## 2019-07-12 DIAGNOSIS — I48.91 ATRIAL FIBRILLATION, UNSPECIFIED TYPE (HCC): ICD-10-CM

## 2019-07-12 DIAGNOSIS — Z79.01 LONG TERM CURRENT USE OF ANTICOAGULANT THERAPY: ICD-10-CM

## 2019-07-12 DIAGNOSIS — Z98.890 S/P MVR (MITRAL VALVE REPAIR): ICD-10-CM

## 2019-07-12 LAB — INR PPP: 2.1 (ref 0.9–1.1)

## 2019-07-12 PROCEDURE — 85610 PROTHROMBIN TIME: CPT | Performed by: NURSE PRACTITIONER

## 2019-07-12 PROCEDURE — 99211 OFF/OP EST MAY X REQ PHY/QHP: CPT | Performed by: NURSE PRACTITIONER

## 2019-07-12 NOTE — PROGRESS NOTES
Today's INR is 2.1.  Pt denies med changes or bleeding problems. Pt states she missed Wednesday nights dose and she took a dose yesterday in the morning, around 1100, and again last night. Pt stated she took three doses yesterday because she missed her dose Wednesday. I confirmed pt usually takes coumadin once at night yet she replaced one missed dose with not one but two extra tablets on Thursday. Pt was educated once again to skip a dose if she misses it and report missed dose to coumadin clinic; pt verbalized. Dose adjusted and pt instructed to have a serving of green veggies tomorrow; pt verbalized. Patient instructed regarding medication; results given and questions answered. Nutritional counseling given.  Dietary factors affecting therapy addressed.  Patient instructed to monitor for excessive bruising or bleeding. Will recheck next week.      This document has been electronically signed by Damian Washington AGACNP-BC Kristofer  On July 12, 2019 3:32 PM

## 2019-07-19 ENCOUNTER — ANTICOAGULATION VISIT (OUTPATIENT)
Dept: CARDIAC SURGERY | Facility: CLINIC | Age: 84
End: 2019-07-19

## 2019-07-19 VITALS — HEART RATE: 65 BPM | DIASTOLIC BLOOD PRESSURE: 82 MMHG | SYSTOLIC BLOOD PRESSURE: 142 MMHG | OXYGEN SATURATION: 99 %

## 2019-07-19 DIAGNOSIS — Z98.890 S/P MVR (MITRAL VALVE REPAIR): ICD-10-CM

## 2019-07-19 DIAGNOSIS — I48.91 ATRIAL FIBRILLATION, UNSPECIFIED TYPE (HCC): ICD-10-CM

## 2019-07-19 DIAGNOSIS — Z79.01 LONG TERM CURRENT USE OF ANTICOAGULANT THERAPY: ICD-10-CM

## 2019-07-19 LAB — INR PPP: 2 (ref 0.9–1.1)

## 2019-07-19 PROCEDURE — 85610 PROTHROMBIN TIME: CPT | Performed by: NURSE PRACTITIONER

## 2019-07-19 NOTE — PROGRESS NOTES
Today's INR is 2.0 but pt missed last night's dose. Denies any new medications or bleeding issues. Denies any s/s of blood clot. PT instructed on dosing and to hold green vegs for 3 days due to missed dose. Recheck INR in 11 days when pt agrees to return. Patient instructed regarding medication; results given and questions answered. Nutritional counseling given.  Dietary factors affecting therapy addressed.  Patient instructed to monitor for excessive bruising or bleeding. PT verbalizes understanding.         This document has been electronically signed by DAISY Hoff @ on July 19, 2019 3:45 PM

## 2019-07-30 ENCOUNTER — ANTICOAGULATION VISIT (OUTPATIENT)
Dept: CARDIAC SURGERY | Facility: CLINIC | Age: 84
End: 2019-07-30

## 2019-07-30 VITALS — HEART RATE: 60 BPM | DIASTOLIC BLOOD PRESSURE: 75 MMHG | OXYGEN SATURATION: 98 % | SYSTOLIC BLOOD PRESSURE: 125 MMHG

## 2019-07-30 DIAGNOSIS — I48.91 ATRIAL FIBRILLATION, UNSPECIFIED TYPE (HCC): ICD-10-CM

## 2019-07-30 DIAGNOSIS — Z98.890 S/P MVR (MITRAL VALVE REPAIR): ICD-10-CM

## 2019-07-30 DIAGNOSIS — Z79.01 LONG TERM CURRENT USE OF ANTICOAGULANT THERAPY: ICD-10-CM

## 2019-07-30 LAB — INR PPP: 2.4 (ref 0.9–1.1)

## 2019-07-30 PROCEDURE — 85610 PROTHROMBIN TIME: CPT | Performed by: NURSE PRACTITIONER

## 2019-07-30 NOTE — PROGRESS NOTES
Today's INR is 2.4.  Patient states no med changes or bleeding problems or unexplained bruising. Patient instructed to continue current dosing schedule. Verbalizes understanding. Will recheck in 3 weeks. Patient instructed regarding medication; results given and questions answered. Nutritional counseling given.  Dietary factors affecting therapy addressed.  Patient instructed to monitor for excessive bruising or bleeding.       This document has been electronically signed by Damian Washington AGACNP-BC Kristofer  On July 30, 2019 3:41 PM

## 2019-08-20 ENCOUNTER — ANTICOAGULATION VISIT (OUTPATIENT)
Dept: CARDIAC SURGERY | Facility: CLINIC | Age: 84
End: 2019-08-20

## 2019-08-20 VITALS — HEART RATE: 63 BPM | SYSTOLIC BLOOD PRESSURE: 125 MMHG | OXYGEN SATURATION: 96 % | DIASTOLIC BLOOD PRESSURE: 82 MMHG

## 2019-08-20 DIAGNOSIS — Z79.01 LONG TERM CURRENT USE OF ANTICOAGULANT THERAPY: ICD-10-CM

## 2019-08-20 DIAGNOSIS — I48.91 ATRIAL FIBRILLATION, UNSPECIFIED TYPE (HCC): ICD-10-CM

## 2019-08-20 DIAGNOSIS — Z98.890 S/P MVR (MITRAL VALVE REPAIR): ICD-10-CM

## 2019-08-20 LAB — INR PPP: 2.7 (ref 0.9–1.1)

## 2019-08-20 PROCEDURE — 85610 PROTHROMBIN TIME: CPT | Performed by: NURSE PRACTITIONER

## 2019-08-20 NOTE — PROGRESS NOTES
Today's INR is 2.7.  Patient states no med changes or bleeding problems or unexplained bruising. Patient instructed to continue current dosing schedule. Verbalizes understanding. Will recheck 1 month.  Patient instructed regarding medication; results given and questions answered. Nutritional counseling given.  Dietary factors affecting therapy addressed.  Patient instructed to monitor for excessive bruising or bleeding.       This document has been electronically signed by ELIECER FreitasCNP-BC Kristofer  On August 20, 2019 3:44 PM

## 2019-09-17 ENCOUNTER — ANTICOAGULATION VISIT (OUTPATIENT)
Dept: CARDIAC SURGERY | Facility: CLINIC | Age: 84
End: 2019-09-17

## 2019-09-17 VITALS — DIASTOLIC BLOOD PRESSURE: 72 MMHG | OXYGEN SATURATION: 98 % | HEART RATE: 64 BPM | SYSTOLIC BLOOD PRESSURE: 118 MMHG

## 2019-09-17 DIAGNOSIS — Z79.01 LONG TERM CURRENT USE OF ANTICOAGULANT THERAPY: ICD-10-CM

## 2019-09-17 DIAGNOSIS — Z98.890 S/P MVR (MITRAL VALVE REPAIR): ICD-10-CM

## 2019-09-17 DIAGNOSIS — I48.91 ATRIAL FIBRILLATION, UNSPECIFIED TYPE (HCC): ICD-10-CM

## 2019-09-17 LAB — INR PPP: 2.3 (ref 0.9–1.1)

## 2019-09-17 PROCEDURE — 85610 PROTHROMBIN TIME: CPT | Performed by: NURSE PRACTITIONER

## 2019-09-17 NOTE — PROGRESS NOTES
Today's INR is 2.3.  Patient states no med changes or bleeding problems or unexplained bruising. Patient instructed to continue current dosing schedule. Verbalizes understanding. Will recheck 1 month.  Patient instructed regarding medication; results given and questions answered. Nutritional counseling given.  Dietary factors affecting therapy addressed.  Patient instructed to monitor for excessive bruising or bleeding.       This document has been electronically signed by ELIECER FreitasCNP-BC Kristofer  On September 17, 2019 3:34 PM

## 2019-10-15 ENCOUNTER — ANTICOAGULATION VISIT (OUTPATIENT)
Dept: CARDIAC SURGERY | Facility: CLINIC | Age: 84
End: 2019-10-15

## 2019-10-15 VITALS — SYSTOLIC BLOOD PRESSURE: 112 MMHG | DIASTOLIC BLOOD PRESSURE: 67 MMHG | HEART RATE: 73 BPM | OXYGEN SATURATION: 95 %

## 2019-10-15 DIAGNOSIS — I48.91 ATRIAL FIBRILLATION, UNSPECIFIED TYPE (HCC): ICD-10-CM

## 2019-10-15 DIAGNOSIS — Z98.890 S/P MVR (MITRAL VALVE REPAIR): ICD-10-CM

## 2019-10-15 DIAGNOSIS — Z79.01 LONG TERM CURRENT USE OF ANTICOAGULANT THERAPY: ICD-10-CM

## 2019-10-15 LAB — INR PPP: 2.6 (ref 0.9–1.1)

## 2019-10-15 PROCEDURE — 85610 PROTHROMBIN TIME: CPT | Performed by: NURSE PRACTITIONER

## 2019-10-15 NOTE — PROGRESS NOTES
Today's INR is 2.6.  Patient states no med changes or bleeding problems or unexplained bruising. Patient instructed to continue current dosing schedule. Verbalizes understanding. Will recheck in 5 weeks. Patient instructed regarding medication; results given and questions answered. Nutritional counseling given.  Dietary factors affecting therapy addressed.  Patient instructed to monitor for excessive bruising or bleeding.         This document has been electronically signed by DAISY Hoff @ on October 15, 2019 3:23 PM

## 2019-10-28 ENCOUNTER — APPOINTMENT (OUTPATIENT)
Dept: LAB | Facility: HOSPITAL | Age: 84
End: 2019-10-28

## 2019-10-28 ENCOUNTER — OFFICE VISIT (OUTPATIENT)
Dept: CARDIOLOGY | Facility: CLINIC | Age: 84
End: 2019-10-28

## 2019-10-28 VITALS
BODY MASS INDEX: 31.31 KG/M2 | WEIGHT: 194.8 LBS | HEIGHT: 66 IN | OXYGEN SATURATION: 98 % | DIASTOLIC BLOOD PRESSURE: 73 MMHG | HEART RATE: 65 BPM | SYSTOLIC BLOOD PRESSURE: 128 MMHG

## 2019-10-28 DIAGNOSIS — I10 ESSENTIAL HYPERTENSION: ICD-10-CM

## 2019-10-28 DIAGNOSIS — I48.19 ATRIAL FIBRILLATION, PERSISTENT (HCC): Primary | ICD-10-CM

## 2019-10-28 DIAGNOSIS — Z79.01 LONG TERM CURRENT USE OF ANTICOAGULANT THERAPY: ICD-10-CM

## 2019-10-28 DIAGNOSIS — Z98.890 S/P MVR (MITRAL VALVE REPAIR): ICD-10-CM

## 2019-10-28 DIAGNOSIS — E78.2 MIXED HYPERLIPIDEMIA: ICD-10-CM

## 2019-10-28 PROCEDURE — 99214 OFFICE O/P EST MOD 30 MIN: CPT | Performed by: INTERNAL MEDICINE

## 2019-10-28 NOTE — PROGRESS NOTES
iMah Hogan  83 y.o. female    10/28/2019  1. Atrial fibrillation, persistent    2. Essential hypertension    3. Mixed hyperlipidemia    4. S/P MVR (mitral valve repair)    5. Long term (current) use of anticoagulants        History of Present Illness  Mrs. Hogan is here for follow-up of above stated problems.  She has done well from a cardiac standpoint with no chest pain, shortness of breath, palpitation, dizziness.  She's been compliant with her medications and her PT and INR are in the therapeutic range.  Her blood pressure was normal.  She has persistent atrial fibrillation which is well rate controlled.  She is known to have elevated LDL.  She has had intolerance to statins and does not wish to consider PCSK9 inhibitors. She has allergy to ACE inhibitor use causing rash and angioedema.     She underwent mitral valve replacement in 10/ 2001 at [facility] in [place].  She has history of hypertension, hypothyroidism     SUBJECTIVE    Allergies   Allergen Reactions   • Macrobid [Nitrofurantoin] Swelling and Rash   • Codeine Sulfate Unknown (See Comments)     Pt is unsure   • Macrobid [Nitrofurantoin Macrocrystal] Rash         Past Medical History:   Diagnosis Date   • Atrial fibrillation (CMS/HCC)    • Closed displaced fracture of lateral malleolus of right fibula    • Dizziness and giddiness    • Edema    • Hyperlipidemia    • Hypertension    • Hypothyroidism    • Hypothyroidism    • Long term (current) use of anticoagulants    • Nonrheumatic mitral valve disorder, unspecified          Past Surgical History:   Procedure Laterality Date   • APPENDECTOMY     • KNEE SURGERY Right    • MITRAL VALVE REPLACEMENT           Family History   Problem Relation Age of Onset   • Cancer Mother    • Breast cancer Mother          Social History     Socioeconomic History   • Marital status:      Spouse name: Not on file   • Number of children: Not on file   • Years of education: Not on file   • Highest  "education level: Not on file   Tobacco Use   • Smoking status: Never Smoker   • Smokeless tobacco: Never Used   Substance and Sexual Activity   • Alcohol use: No   • Drug use: No   • Sexual activity: Defer         Current Outpatient Medications   Medication Sig Dispense Refill   • cholecalciferol (VITAMIN D3) 1000 units tablet Take 2,000 Units by mouth Daily.     • diphenhydrAMINE (BENADRYL) 25 mg capsule Take 25 mg by mouth Every 6 (Six) Hours As Needed for Itching.     • metoprolol tartrate (LOPRESSOR) 25 MG tablet TAKE ONE TABLET BY MOUTH EVERY TWELVE HOURS 60 tablet 6   • warfarin (COUMADIN) 2.5 MG tablet Take 1 tablet by mouth Daily. 30 tablet 3     No current facility-administered medications for this visit.          OBJECTIVE    /73   Pulse 65   Ht 167.6 cm (65.98\")   Wt 88.4 kg (194 lb 12.8 oz)   SpO2 98%   BMI 31.46 kg/m²         Review of Systems     Constitutional:  Denies recent weight loss, weight gain, fever or chills. fatigue     HENT:  Denies any hearing loss, epistaxis, hoarseness, or difficulty speaking.     Eyes: Wears eyeglasses or contact lenses     Respiratory:  Denies dyspnea with exertion,no cough, wheezing, or hemoptysis.     Cardiovascular: Negative for palpations, chest pain, orthopnea, PND    Gastrointestinal:  Denies change in bowel habits, dyspepsia, ulcer disease, hematochezia, or melena.     Endocrine: Negative for cold intolerance, heat intolerance, polydipsia, polyphagia and polyuria.     Genitourinary: Negative.      Musculoskeletal: Denies any history of arthritic symptoms or back problems     Skin:  Denies any change in hair or nails, rashes, or skin lesions.     Allergic/Immunologic: Negative.  Negative for environmental allergies, food allergies and immunocompromised state.     Neurological:  Denies any history of recurrent headaches, strokes, TIA, or seizure disorder.     Hematological: Denies any food allergies, seasonal allergies, bleeding disorders, or " lymphadenopathy.     Psychiatric/Behavioral: Denies any history of depression, substance abuse, or change in cognitive function.         Physical Exam     Constitutional: Cooperative, alert and oriented, in no acute distress.     HENT:   Head: Normocephalic, normal hair patterns, no masses or tenderness.  Ears, Nose, and Throat: No gross abnormalities. No pallor or cyanosis.   Eyes: EOMS intact, PERRL, conjunctivae and lids unremarkable. Fundoscopic exam and visual fields not performed.   Neck: No palpable masses or adenopathy, no thyromegaly, no JVD, carotid pulses are full and equal bilaterally and without  Bruits.     Cardiovascular: Irregular rhythm, normal prosthetic valve sounds, no S3 or S4.  No murmurs, gallops, or rubs detected.     Pulmonary/Chest: Chest: normal symmetry,  normal respiratory excursion, no intercostal retraction, no use of accessory muscles.            Pulmonary: Normal breath sounds. No rales or ronchi.    Abdominal: Abdomen soft, bowel sounds normoactive, no masses, no hepatosplenomegaly, non-tender, no bruits.     Musculoskeletal: No deformities, clubbing, cyanosis, erythema, or edema observed.    Neurological: No gross motor or sensory deficits noted, affect appropriate, oriented to time, person, place.     Skin: Warm and dry to the touch, no apparent skin lesions or masses noted.     Psychiatric: She has a normal mood and affect. Her behavior is normal. Judgment and thought content normal.         Procedures      Lab Results   Component Value Date    WBC 10.02 (H) 03/16/2018    HGB 13.8 03/16/2018    HCT 41.1 03/16/2018    MCV 91.7 03/16/2018     03/16/2018     Lab Results   Component Value Date    GLUCOSE 113 (H) 03/16/2018    BUN 23 (H) 03/16/2018    CREATININE 0.78 03/16/2018    EGFRIFNONA 71 03/16/2018    BCR 29.5 (H) 03/16/2018    CO2 28.0 03/16/2018    CALCIUM 9.3 03/16/2018    ALBUMIN 3.50 03/16/2018    AST 21 03/16/2018    ALT 31 03/16/2018     Lab Results   Component  Value Date    CHOL 234 (H) 01/27/2017     Lab Results   Component Value Date    TRIG 199 01/27/2017    TRIG 190 09/01/2015     Lab Results   Component Value Date    HDL 52 (L) 01/27/2017     No components found for: LDLCALC  Lab Results   Component Value Date     (H) 01/27/2017     09/01/2015     No results found for: HDLLDLRATIO  No components found for: CHOLHDL  No results found for: HGBA1C  Lab Results   Component Value Date    TSH 0.080 (L) 03/16/2018           ASSESSMENT AND PLAN  Ms. Hogan is stable with regards to her heart with no definite evidence of angina or congestive heart failure.  Her prosthetic valve is functioning well clinically.  She does have some degree of generalized fatigue the exact etiology of which is unclear.  CBC, CMP, thyroid function studies are being checked today. Her current dose of metoprolol tartrate and warfarin have been continued.    Miah was seen today for follow-up.    Diagnoses and all orders for this visit:    Atrial fibrillation, persistent  -     Lipid Panel; Future  -     CBC & Differential; Future  -     Comprehensive Metabolic Panel; Future  -     T4, Free; Future  -     TSH; Future    Essential hypertension  -     Lipid Panel; Future  -     CBC & Differential; Future  -     Comprehensive Metabolic Panel; Future  -     T4, Free; Future  -     TSH; Future    Mixed hyperlipidemia  -     Lipid Panel; Future  -     CBC & Differential; Future  -     Comprehensive Metabolic Panel; Future  -     T4, Free; Future  -     TSH; Future    S/P MVR (mitral valve repair)  -     Lipid Panel; Future  -     CBC & Differential; Future  -     Comprehensive Metabolic Panel; Future  -     T4, Free; Future  -     TSH; Future    Long term (current) use of anticoagulants        Patient's Body mass index is 31.46 kg/m². BMI is above normal parameters. Recommendations include: exercise counseling and nutrition counseling.  Patient is a non-smoker    Kg Adams  MD  10/28/2019  6:24 PM

## 2019-11-19 ENCOUNTER — DOCUMENTATION (OUTPATIENT)
Dept: CARDIAC SURGERY | Facility: CLINIC | Age: 84
End: 2019-11-19

## 2019-11-26 ENCOUNTER — ANTICOAGULATION VISIT (OUTPATIENT)
Dept: CARDIAC SURGERY | Facility: CLINIC | Age: 84
End: 2019-11-26

## 2019-11-26 VITALS — OXYGEN SATURATION: 97 % | SYSTOLIC BLOOD PRESSURE: 130 MMHG | DIASTOLIC BLOOD PRESSURE: 82 MMHG | HEART RATE: 82 BPM

## 2019-11-26 DIAGNOSIS — I48.91 ATRIAL FIBRILLATION, UNSPECIFIED TYPE (HCC): ICD-10-CM

## 2019-11-26 DIAGNOSIS — Z98.890 S/P MVR (MITRAL VALVE REPAIR): ICD-10-CM

## 2019-11-26 DIAGNOSIS — Z79.01 LONG TERM CURRENT USE OF ANTICOAGULANT THERAPY: ICD-10-CM

## 2019-11-26 LAB — INR PPP: 2.8 (ref 0.9–1.1)

## 2019-11-26 PROCEDURE — 85610 PROTHROMBIN TIME: CPT | Performed by: NURSE PRACTITIONER

## 2019-11-26 PROCEDURE — 99211 OFF/OP EST MAY X REQ PHY/QHP: CPT | Performed by: NURSE PRACTITIONER

## 2019-11-26 NOTE — PROGRESS NOTES
Today's INR is 2.8.   Pt states she started Amoxicillin yesterday for 10 days for sinus infections.  Pt denies bleeding issues.  Adjusted coumadin dose slightly and instructed pt to have a green vegetable serving today and Friday.  Recheck INR next Monday.  Pt verbalizes.  Patient instructed regarding medication; results given and questions answered. Nutritional counseling given.  Dietary factors affecting therapy addressed.  Patient instructed to monitor for excessive bruising or bleeding.  Findings reported by Malena Ramon RN.         This document has been electronically signed by Coby Marinelli, DAISY @ on November 26, 2019 9:37 AM

## 2019-12-02 ENCOUNTER — DOCUMENTATION (OUTPATIENT)
Dept: CARDIAC SURGERY | Facility: CLINIC | Age: 84
End: 2019-12-02

## 2019-12-03 ENCOUNTER — ANTICOAGULATION VISIT (OUTPATIENT)
Dept: CARDIAC SURGERY | Facility: CLINIC | Age: 84
End: 2019-12-03

## 2019-12-03 VITALS — OXYGEN SATURATION: 92 % | HEART RATE: 92 BPM | DIASTOLIC BLOOD PRESSURE: 70 MMHG | SYSTOLIC BLOOD PRESSURE: 118 MMHG

## 2019-12-03 DIAGNOSIS — I48.91 ATRIAL FIBRILLATION, UNSPECIFIED TYPE (HCC): ICD-10-CM

## 2019-12-03 DIAGNOSIS — Z79.01 LONG TERM CURRENT USE OF ANTICOAGULANT THERAPY: ICD-10-CM

## 2019-12-03 DIAGNOSIS — Z98.890 S/P MVR (MITRAL VALVE REPAIR): ICD-10-CM

## 2019-12-03 LAB — INR PPP: 1.9 (ref 0.9–1.1)

## 2019-12-03 PROCEDURE — 85610 PROTHROMBIN TIME: CPT | Performed by: NURSE PRACTITIONER

## 2019-12-03 PROCEDURE — 99211 OFF/OP EST MAY X REQ PHY/QHP: CPT | Performed by: NURSE PRACTITIONER

## 2019-12-03 NOTE — PROGRESS NOTES
Today's INR is 1.9.   Pt will complete AB tomorrow.  Pt did increase green as instructed.  Pt will resume her previous therapeutic coumadin dose and recheck INR in 5 wks.  Pt verbalizes.  Patient instructed regarding medication; results given and questions answered. Nutritional counseling given.  Dietary factors affecting therapy addressed.  Patient instructed to monitor for excessive bruising or bleeding.  Findings reported by Malena Ramon RN.       This document has been electronically signed by KAREN Freitas-BC @  On December 3, 2019 3:36 PM

## 2020-01-07 ENCOUNTER — ANTICOAGULATION VISIT (OUTPATIENT)
Dept: CARDIAC SURGERY | Facility: CLINIC | Age: 85
End: 2020-01-07

## 2020-01-07 VITALS — DIASTOLIC BLOOD PRESSURE: 66 MMHG | OXYGEN SATURATION: 95 % | SYSTOLIC BLOOD PRESSURE: 128 MMHG | HEART RATE: 56 BPM

## 2020-01-07 DIAGNOSIS — Z98.890 S/P MVR (MITRAL VALVE REPAIR): ICD-10-CM

## 2020-01-07 DIAGNOSIS — Z79.01 LONG TERM CURRENT USE OF ANTICOAGULANT THERAPY: ICD-10-CM

## 2020-01-07 DIAGNOSIS — I48.91 ATRIAL FIBRILLATION, UNSPECIFIED TYPE (HCC): ICD-10-CM

## 2020-01-07 LAB — INR PPP: 2.2 (ref 0.9–1.1)

## 2020-01-07 PROCEDURE — 85610 PROTHROMBIN TIME: CPT | Performed by: NURSE PRACTITIONER

## 2020-01-07 NOTE — PROGRESS NOTES
Today's INR is 2.2.   Patient states no med changes or bleeding problems or unexplained bruising. Patient instructed to continue current dosing schedule. Verbalizes understanding. Will recheck 1 month.   Patient instructed regarding medication; results given and questions answered. Nutritional counseling given.  Dietary factors affecting therapy addressed.  Patient instructed to monitor for excessive bruising or bleeding.  Findings reported by Malena Ramon RN.       This document has been electronically signed by Damian Washington AGACNP-BC @  On January 7, 2020 3:04 PM

## 2020-01-30 RX ORDER — WARFARIN SODIUM 2.5 MG/1
TABLET ORAL
Qty: 35 TABLET | Refills: 5 | Status: SHIPPED | OUTPATIENT
Start: 2020-01-30 | End: 2020-08-12

## 2020-02-11 ENCOUNTER — ANTICOAGULATION VISIT (OUTPATIENT)
Dept: CARDIAC SURGERY | Facility: CLINIC | Age: 85
End: 2020-02-11

## 2020-02-11 VITALS — SYSTOLIC BLOOD PRESSURE: 130 MMHG | OXYGEN SATURATION: 98 % | DIASTOLIC BLOOD PRESSURE: 78 MMHG | HEART RATE: 62 BPM

## 2020-02-11 DIAGNOSIS — Z98.890 S/P MVR (MITRAL VALVE REPAIR): ICD-10-CM

## 2020-02-11 DIAGNOSIS — Z79.01 LONG TERM CURRENT USE OF ANTICOAGULANT THERAPY: ICD-10-CM

## 2020-02-11 DIAGNOSIS — I48.91 ATRIAL FIBRILLATION, UNSPECIFIED TYPE (HCC): ICD-10-CM

## 2020-02-11 LAB — INR PPP: 2.6 (ref 0.9–1.1)

## 2020-02-11 PROCEDURE — 85610 PROTHROMBIN TIME: CPT | Performed by: NURSE PRACTITIONER

## 2020-02-11 NOTE — PROGRESS NOTES
Today's INR is 2.6.   Patient states no med changes or bleeding problems or unexplained bruising. Patient instructed to continue current dosing schedule. Verbalizes understanding. Will recheck 1 month.   Patient instructed regarding medication; results given and questions answered. Nutritional counseling given.  Dietary factors affecting therapy addressed.  Patient instructed to monitor for excessive bruising or bleeding.  Findings reported by Malena Ramon RN.         This document has been electronically signed by DAISY Hoff @ on February 11, 2020 3:09 PM

## 2020-03-17 ENCOUNTER — ANTICOAGULATION VISIT (OUTPATIENT)
Dept: CARDIAC SURGERY | Facility: CLINIC | Age: 85
End: 2020-03-17

## 2020-03-17 VITALS — OXYGEN SATURATION: 98 % | SYSTOLIC BLOOD PRESSURE: 120 MMHG | HEART RATE: 89 BPM | DIASTOLIC BLOOD PRESSURE: 70 MMHG

## 2020-03-17 DIAGNOSIS — Z98.890 S/P MVR (MITRAL VALVE REPAIR): ICD-10-CM

## 2020-03-17 DIAGNOSIS — Z79.01 LONG TERM CURRENT USE OF ANTICOAGULANT THERAPY: ICD-10-CM

## 2020-03-17 DIAGNOSIS — I48.91 ATRIAL FIBRILLATION, UNSPECIFIED TYPE (HCC): ICD-10-CM

## 2020-03-17 LAB — INR PPP: 1.9 (ref 0.9–1.1)

## 2020-03-17 PROCEDURE — 85610 PROTHROMBIN TIME: CPT | Performed by: NURSE PRACTITIONER

## 2020-03-17 PROCEDURE — 99211 OFF/OP EST MAY X REQ PHY/QHP: CPT | Performed by: NURSE PRACTITIONER

## 2020-03-17 NOTE — PROGRESS NOTES
Today's INR is 1.9.   Pt denies missed coumadin doses.  Pt states she has consumed extra green as of late.  I adjusted coumadin dose for today only and instructed pt to limit green intake today.  Pt requests a monthly appt since her INR is usually therapeutic.  Patient instructed regarding medication; results given and questions answered. Nutritional counseling given.  Dietary factors affecting therapy addressed.  Patient instructed to monitor for excessive bruising or bleeding.  Findings reported by Malena Ramon RN.       This document has been electronically signed by KAREN Freitas-BC @  On March 17, 2020 15:17

## 2020-05-05 ENCOUNTER — ANTICOAGULATION VISIT (OUTPATIENT)
Dept: CARDIAC SURGERY | Facility: CLINIC | Age: 85
End: 2020-05-05

## 2020-05-05 VITALS — TEMPERATURE: 98.4 F

## 2020-05-05 DIAGNOSIS — I48.91 ATRIAL FIBRILLATION, UNSPECIFIED TYPE (HCC): ICD-10-CM

## 2020-05-05 DIAGNOSIS — Z98.890 S/P MVR (MITRAL VALVE REPAIR): ICD-10-CM

## 2020-05-05 DIAGNOSIS — Z79.01 LONG TERM CURRENT USE OF ANTICOAGULANT THERAPY: ICD-10-CM

## 2020-05-05 LAB — INR PPP: 2.5 (ref 0.9–1.1)

## 2020-05-05 PROCEDURE — 85610 PROTHROMBIN TIME: CPT | Performed by: NURSE PRACTITIONER

## 2020-05-05 NOTE — PROGRESS NOTES
Today's INR is 2.5.   Patient checked curbside due to COVID 19 pandemic precautions.   Patient states no med changes or bleeding problems or unexplained bruising. Patient instructed to continue current dosing schedule. Verbalizes understanding. Will recheck 1 month.   Patient instructed regarding medication; results given and questions answered. Nutritional counseling given.  Dietary factors affecting therapy addressed.  Patient instructed to monitor for excessive bruising or bleeding.  Findings reported by Malena Ramon RN.         This document has been electronically signed by DAISY Hoff @ on May 5, 2020 15:23

## 2020-05-11 ENCOUNTER — OFFICE VISIT (OUTPATIENT)
Dept: CARDIOLOGY | Facility: CLINIC | Age: 85
End: 2020-05-11

## 2020-05-11 VITALS — HEIGHT: 65 IN | BODY MASS INDEX: 32.99 KG/M2 | WEIGHT: 198 LBS

## 2020-05-11 DIAGNOSIS — E78.2 MIXED HYPERLIPIDEMIA: ICD-10-CM

## 2020-05-11 DIAGNOSIS — Z98.890 S/P MVR (MITRAL VALVE REPAIR): ICD-10-CM

## 2020-05-11 DIAGNOSIS — I48.91 ATRIAL FIBRILLATION, UNSPECIFIED TYPE (HCC): ICD-10-CM

## 2020-05-11 DIAGNOSIS — I10 ESSENTIAL HYPERTENSION: ICD-10-CM

## 2020-05-11 DIAGNOSIS — I48.19 ATRIAL FIBRILLATION, PERSISTENT (HCC): Primary | ICD-10-CM

## 2020-05-11 DIAGNOSIS — I34.9 NONRHEUMATIC MITRAL VALVE DISORDER: ICD-10-CM

## 2020-05-11 DIAGNOSIS — Z79.01 LONG TERM CURRENT USE OF ANTICOAGULANT THERAPY: ICD-10-CM

## 2020-05-11 DIAGNOSIS — E78.2 MIXED HYPERLIPIDEMIA: Primary | ICD-10-CM

## 2020-05-11 PROCEDURE — 99442 PR PHYS/QHP TELEPHONE EVALUATION 11-20 MIN: CPT | Performed by: INTERNAL MEDICINE

## 2020-05-11 NOTE — PROGRESS NOTES
Miah Hogan  84 y.o. female      1. Atrial fibrillation, persistent    2. Essential hypertension    3. Mixed hyperlipidemia    4. Nonrheumatic mitral valve disorder, unspecified    5. S/P MVR (mitral valve repair)    6. Long term (current) use of anticoagulants        History of Present Illness:  This visit has been rescheduled as a phone visit to comply with patient safety concerns in accordance with CDC recommendations. Total time of discussion was 15 minutes.    You have chosen to receive care through a telephone visit. Do you consent to use a telephone visit for your medical care today? Yes    Mrs. Hogan is here for follow-up of above stated problems.  She has done well from a cardiac standpoint with no chest pain, shortness of breath, palpitation, dizziness.  She's been compliant with her medications and her PT and INR are in the therapeutic range.  Her blood pressure was normal at 140/80 mm hg earlier today.  She has persistent atrial fibrillation which is well rate controlled.  She is known to have elevated LDL.  She has had intolerance to statins and does not wish to consider PCSK9 inhibitors. She has allergy to ACE inhibitor use causing rash and angioedema.     She underwent mitral valve replacement in 10/ 2001 at [facility] in [place].  She has history of hypertension, hypothyroidism     SUBJECTIVE    Allergies   Allergen Reactions   • Macrobid [Nitrofurantoin] Swelling and Rash   • Codeine Sulfate Unknown (See Comments)     Pt is unsure         Past Medical History:   Diagnosis Date   • Atrial fibrillation (CMS/HCC)    • Closed displaced fracture of lateral malleolus of right fibula    • Dizziness and giddiness    • Edema    • Hyperlipidemia    • Hypertension    • Hypothyroidism    • Hypothyroidism    • Long term (current) use of anticoagulants    • Nonrheumatic mitral valve disorder, unspecified          Past Surgical History:   Procedure Laterality Date   • APPENDECTOMY     • KNEE  "SURGERY Right    • MITRAL VALVE REPLACEMENT           Family History   Problem Relation Age of Onset   • Cancer Mother    • Breast cancer Mother          Social History     Socioeconomic History   • Marital status:      Spouse name: Not on file   • Number of children: Not on file   • Years of education: Not on file   • Highest education level: Not on file   Tobacco Use   • Smoking status: Never Smoker   • Smokeless tobacco: Never Used   Substance and Sexual Activity   • Alcohol use: No   • Drug use: No   • Sexual activity: Defer         Current Outpatient Medications   Medication Sig Dispense Refill   • cholecalciferol (VITAMIN D3) 1000 units tablet Take 2,000 Units by mouth Daily.     • diphenhydrAMINE (BENADRYL) 25 mg capsule Take 25 mg by mouth Every 6 (Six) Hours As Needed for Itching.     • metoprolol tartrate (LOPRESSOR) 25 MG tablet Take 1 tablet by mouth Every 12 (Twelve) Hours. 60 tablet 6   • warfarin (COUMADIN) 2.5 MG tablet Take 1 tablet nightly except on Friday take 1.5 tablets OR AS DIRECTED 35 tablet 5     No current facility-administered medications for this visit.          OBJECTIVE    Ht 165.1 cm (65\")   Wt 89.8 kg (198 lb)   BMI 32.95 kg/m²         Review of Systems     Constitutional:  Denies recent weight loss, weight gain, fever or chills. fatigue     HENT:  Denies any hearing loss, epistaxis, hoarseness, or difficulty speaking.     Eyes: Wears eyeglasses or contact lenses     Respiratory:  Denies dyspnea with exertion,no cough, wheezing, or hemoptysis.     Cardiovascular: Negative for palpations, chest pain, orthopnea, PND    Gastrointestinal:  Denies change in bowel habits, dyspepsia, ulcer disease, hematochezia, or melena.     Endocrine: Negative for cold intolerance, heat intolerance, polydipsia, polyphagia and polyuria.     Genitourinary: Negative.      Neurological:  Denies any history of recurrent headaches, strokes, TIA, or seizure disorder.     Hematological: Denies any food " allergies, seasonal allergies, bleeding disorders, or lymphadenopathy.     Psychiatric/Behavioral: Denies any history of depression, substance abuse, or change in cognitive function.           Lab Results   Component Value Date    WBC 10.02 (H) 03/16/2018    HGB 13.8 03/16/2018    HCT 41.1 03/16/2018    MCV 91.7 03/16/2018     03/16/2018     Lab Results   Component Value Date    GLUCOSE 113 (H) 03/16/2018    BUN 23 (H) 03/16/2018    CREATININE 0.78 03/16/2018    EGFRIFNONA 71 03/16/2018    BCR 29.5 (H) 03/16/2018    CO2 28.0 03/16/2018    CALCIUM 9.3 03/16/2018    ALBUMIN 3.50 03/16/2018    AST 21 03/16/2018    ALT 31 03/16/2018     Lab Results   Component Value Date    CHOL 234 (H) 01/27/2017     Lab Results   Component Value Date    TRIG 199 01/27/2017    TRIG 190 09/01/2015     Lab Results   Component Value Date    HDL 52 (L) 01/27/2017     No components found for: LDLCALC  Lab Results   Component Value Date     (H) 01/27/2017     09/01/2015     No results found for: HDLLDLRATIO  No components found for: CHOLHDL  No results found for: HGBA1C  Lab Results   Component Value Date    TSH 0.080 (L) 03/16/2018           ASSESSMENT AND PLAN  Ms. Hogan is stable with regards to her heart with no definite evidence of angina or congestive heart failure.  Her prosthetic valve is known to be functioning well.  I have continued her current medication including metoprolol tartrate and warfarin.  An echocardiogram will be arranged on her next visit.  I have strongly advised her to see her primary care physician and have blood work done to check her electrolytes, BUN, creatinine, lipid profile.    Miah was seen today for follow-up.    Diagnoses and all orders for this visit:    Atrial fibrillation, persistent    Essential hypertension    Mixed hyperlipidemia    Nonrheumatic mitral valve disorder, unspecified    S/P MVR (mitral valve repair)    Long term (current) use of anticoagulants        Patient's Body  mass index is 32.95 kg/m². BMI is above normal parameters. Recommendations include: exercise counseling and nutrition counseling.  Patient is a non-smoker    Kg Adams MD  5/11/2020  12:33

## 2020-06-23 ENCOUNTER — ANTICOAGULATION VISIT (OUTPATIENT)
Dept: CARDIAC SURGERY | Facility: CLINIC | Age: 85
End: 2020-06-23

## 2020-06-23 VITALS — OXYGEN SATURATION: 98 % | HEART RATE: 51 BPM

## 2020-06-23 DIAGNOSIS — Z98.890 S/P MVR (MITRAL VALVE REPAIR): ICD-10-CM

## 2020-06-23 DIAGNOSIS — I48.91 ATRIAL FIBRILLATION, UNSPECIFIED TYPE (HCC): ICD-10-CM

## 2020-06-23 DIAGNOSIS — Z79.01 LONG TERM CURRENT USE OF ANTICOAGULANT THERAPY: ICD-10-CM

## 2020-06-23 LAB — INR PPP: 2.2 (ref 0.9–1.1)

## 2020-06-23 PROCEDURE — 85610 PROTHROMBIN TIME: CPT | Performed by: NURSE PRACTITIONER

## 2020-06-23 NOTE — PROGRESS NOTES
Today's INR is 2.2.   Patient states no med changes or bleeding problems or unexplained bruising. Patient instructed to continue current dosing schedule. Verbalizes understanding. Will recheck 1 month.   Patient instructed regarding medication; results given and questions answered. Nutritional counseling given.  Dietary factors affecting therapy addressed.  Patient instructed to monitor for excessive bruising or bleeding.  Findings reported by Malena Ramon RN.       This document has been electronically signed by Damian Washington AGACNP-BC @  On June 23, 2020 15:42

## 2020-06-30 ENCOUNTER — HOSPITAL ENCOUNTER (OUTPATIENT)
Dept: ULTRASOUND IMAGING | Facility: HOSPITAL | Age: 85
Discharge: HOME | End: 2020-06-30
Admitting: NURSE PRACTITIONER

## 2020-06-30 DIAGNOSIS — I10 ESSENTIAL (PRIMARY) HYPERTENSION: ICD-10-CM

## 2020-06-30 DIAGNOSIS — N18.30 CHRONIC KIDNEY DISEASE, STAGE 3 (HCC): ICD-10-CM

## 2020-06-30 DIAGNOSIS — Z95.2 H/O PROSTHETIC HEART VALVE: ICD-10-CM

## 2020-06-30 PROCEDURE — 76775 US EXAM ABDO BACK WALL LIM: CPT

## 2020-08-11 ENCOUNTER — ANTICOAGULATION VISIT (OUTPATIENT)
Dept: CARDIAC SURGERY | Facility: CLINIC | Age: 85
End: 2020-08-11

## 2020-08-11 VITALS — OXYGEN SATURATION: 96 % | HEART RATE: 51 BPM

## 2020-08-11 DIAGNOSIS — Z79.01 LONG TERM CURRENT USE OF ANTICOAGULANT THERAPY: ICD-10-CM

## 2020-08-11 DIAGNOSIS — I48.91 ATRIAL FIBRILLATION, UNSPECIFIED TYPE (HCC): ICD-10-CM

## 2020-08-11 DIAGNOSIS — Z98.890 S/P MVR (MITRAL VALVE REPAIR): ICD-10-CM

## 2020-08-11 LAB — INR PPP: 2.5 (ref 0.9–1.1)

## 2020-08-11 PROCEDURE — 85610 PROTHROMBIN TIME: CPT | Performed by: NURSE PRACTITIONER

## 2020-08-11 NOTE — PROGRESS NOTES
Today's INR is 2.5.  Patient states no med changes or bleeding problems or unexplained bruising. Patient instructed to continue current dosing schedule. Verbalizes understanding. Will recheck in 7 weeks.  Patient instructed regarding medication; results given and questions answered. Nutritional counseling given.  Dietary factors affecting therapy addressed.  Patient instructed to monitor for excessive bruising or bleeding. Findings reported by Ivanna Hahn RN.          This document has been electronically signed by Coby Marinelli, DAISY @ on August 11, 2020 14:59

## 2020-08-12 RX ORDER — WARFARIN SODIUM 2.5 MG/1
TABLET ORAL
Qty: 35 TABLET | Refills: 5 | Status: SHIPPED | OUTPATIENT
Start: 2020-08-12 | End: 2021-03-08 | Stop reason: SDUPTHER

## 2020-10-01 ENCOUNTER — ANTICOAGULATION VISIT (OUTPATIENT)
Dept: CARDIAC SURGERY | Facility: CLINIC | Age: 85
End: 2020-10-01

## 2020-10-01 VITALS — OXYGEN SATURATION: 98 % | HEART RATE: 60 BPM

## 2020-10-01 DIAGNOSIS — Z79.01 LONG TERM CURRENT USE OF ANTICOAGULANT THERAPY: ICD-10-CM

## 2020-10-01 DIAGNOSIS — I48.91 ATRIAL FIBRILLATION, UNSPECIFIED TYPE (HCC): ICD-10-CM

## 2020-10-01 DIAGNOSIS — Z98.890 S/P MVR (MITRAL VALVE REPAIR): ICD-10-CM

## 2020-10-01 LAB — INR PPP: 2.8 (ref 0.9–1.1)

## 2020-10-01 PROCEDURE — 85610 PROTHROMBIN TIME: CPT | Performed by: NURSE PRACTITIONER

## 2020-10-01 NOTE — PROGRESS NOTES
Today's INR is 2.8. PT denies any new medications or bleeding issues. PT denies any s/s of blood clot. PT instructed to continue same dose and Coumadin friendly diet. PT will be seen in 7 weeks. Patient instructed regarding medication; results given and questions answered. Nutritional counseling given.  Dietary factors affecting therapy addressed.  Patient instructed to monitor for excessive bruising or bleeding. PT verbalizes understanding. Findings reported by Marni Ramos RN.       This document has been electronically signed by KAREN Freitas-BC @  On October 1, 2020 14:37 CDT

## 2020-11-13 ENCOUNTER — ANTICOAGULATION VISIT (OUTPATIENT)
Dept: CARDIAC SURGERY | Facility: CLINIC | Age: 85
End: 2020-11-13

## 2020-11-13 ENCOUNTER — OFFICE VISIT (OUTPATIENT)
Dept: CARDIOLOGY | Facility: CLINIC | Age: 85
End: 2020-11-13

## 2020-11-13 VITALS — OXYGEN SATURATION: 98 % | HEART RATE: 77 BPM

## 2020-11-13 VITALS
SYSTOLIC BLOOD PRESSURE: 142 MMHG | OXYGEN SATURATION: 98 % | DIASTOLIC BLOOD PRESSURE: 80 MMHG | HEIGHT: 65 IN | HEART RATE: 57 BPM | WEIGHT: 202.2 LBS | BODY MASS INDEX: 33.69 KG/M2

## 2020-11-13 DIAGNOSIS — I34.9 NONRHEUMATIC MITRAL VALVE DISORDER: ICD-10-CM

## 2020-11-13 DIAGNOSIS — Z79.01 LONG TERM CURRENT USE OF ANTICOAGULANT THERAPY: ICD-10-CM

## 2020-11-13 DIAGNOSIS — Z98.890 S/P MVR (MITRAL VALVE REPAIR): ICD-10-CM

## 2020-11-13 DIAGNOSIS — I10 ESSENTIAL HYPERTENSION: ICD-10-CM

## 2020-11-13 DIAGNOSIS — E78.2 MIXED HYPERLIPIDEMIA: ICD-10-CM

## 2020-11-13 DIAGNOSIS — I48.91 ATRIAL FIBRILLATION, UNSPECIFIED TYPE (HCC): ICD-10-CM

## 2020-11-13 DIAGNOSIS — Z51.81 ENCOUNTER FOR THERAPEUTIC DRUG MONITORING: ICD-10-CM

## 2020-11-13 DIAGNOSIS — I48.91 ATRIAL FIBRILLATION, UNSPECIFIED TYPE (HCC): Primary | ICD-10-CM

## 2020-11-13 LAB
INR PPP: 1.9 (ref 0.9–1.1)
QT INTERVAL: 412 MS
QTC INTERVAL: 466 MS

## 2020-11-13 PROCEDURE — 93793 ANTICOAG MGMT PT WARFARIN: CPT | Performed by: NURSE PRACTITIONER

## 2020-11-13 PROCEDURE — 36416 COLLJ CAPILLARY BLOOD SPEC: CPT | Performed by: NURSE PRACTITIONER

## 2020-11-13 PROCEDURE — 99214 OFFICE O/P EST MOD 30 MIN: CPT | Performed by: INTERNAL MEDICINE

## 2020-11-13 PROCEDURE — 93000 ELECTROCARDIOGRAM COMPLETE: CPT | Performed by: INTERNAL MEDICINE

## 2020-11-13 PROCEDURE — 85610 PROTHROMBIN TIME: CPT | Performed by: NURSE PRACTITIONER

## 2020-11-13 RX ORDER — FUROSEMIDE 20 MG/1
20 TABLET ORAL AS NEEDED
COMMUNITY

## 2020-11-13 NOTE — PROGRESS NOTES
Miah Hogan  85 y.o. female    1. Atrial fibrillation, unspecified type (CMS/HCC)    2. Nonrheumatic mitral valve disorder, unspecified    3. Essential hypertension    4. Mixed hyperlipidemia    5. S/P MVR (mitral valve repair)        History of Present Illness:  Mrs. Hogan is here for follow-up of above stated problems.  She has history of mitral valve disease with mitral valve replacement in October 2001 at [facility] in [place], hypertension, hypothyroidism.      She has done well from a cardiac standpoint with no chest pain, shortness of breath, palpitation, dizziness.  She has been compliant with her medications and her PT and INR are in the therapeutic range.  Her blood pressure was normal.  She has persistent atrial fibrillation which is well rate controlled.      She is known to have elevated LDL and when checked in May 2020 was 200.  She has had intolerance to statins and does not wish to consider PCSK9 inhibitors. She has allergy to ACE inhibitor use causing rash and angioedema.     EKG showed atrial fibrillation with heart rate of 77 bpm.  Nonspecific ST-T changes.  QTc interval 466 ms.    Clinical exam today showed normally functioning prosthetic valve.  No signs of congestive heart failure was noted.  She had a PT and INR checked today and was low at 1.9 and the dose of warfarin is being adjusted.    SUBJECTIVE    Allergies   Allergen Reactions   • Macrobid [Nitrofurantoin] Swelling and Rash   • Codeine Sulfate Unknown (See Comments)     Pt is unsure         Past Medical History:   Diagnosis Date   • Atrial fibrillation (CMS/HCC)    • Closed displaced fracture of lateral malleolus of right fibula    • Dizziness and giddiness    • Edema    • Hyperlipidemia    • Hypertension    • Hypothyroidism    • Hypothyroidism    • Long term (current) use of anticoagulants    • Nonrheumatic mitral valve disorder, unspecified          Past Surgical History:   Procedure Laterality Date   • APPENDECTOMY  "    • KNEE SURGERY Right    • MITRAL VALVE REPLACEMENT           Family History   Problem Relation Age of Onset   • Cancer Mother    • Breast cancer Mother          Social History     Socioeconomic History   • Marital status:      Spouse name: Not on file   • Number of children: Not on file   • Years of education: Not on file   • Highest education level: Not on file   Tobacco Use   • Smoking status: Never Smoker   • Smokeless tobacco: Never Used   Substance and Sexual Activity   • Alcohol use: No   • Drug use: No   • Sexual activity: Defer         Current Outpatient Medications   Medication Sig Dispense Refill   • cholecalciferol (VITAMIN D3) 1000 units tablet Take 1,000 Units by mouth Daily.     • furosemide (LASIX) 20 MG tablet Take 20 mg by mouth As Needed.     • metoprolol tartrate (LOPRESSOR) 25 MG tablet TAKE ONE TABLET BY MOUTH EVERY TWELVE HOURS 60 tablet 6   • warfarin (COUMADIN) 2.5 MG tablet TAKE 1 TABLET BY MOUTH NIGHTLY EXCEPT ON FRIDAY TAKE 1.5 TABLETS OR AS DIRECTED 35 tablet 5     No current facility-administered medications for this visit.          OBJECTIVE    /80 (BP Location: Left arm, Patient Position: Sitting, Cuff Size: Adult)   Pulse 57   Ht 165.1 cm (65\")   Wt 91.7 kg (202 lb 3.2 oz)   SpO2 98%   BMI 33.65 kg/m²         Review of Systems     Constitutional:  Denies recent weight loss, weight gain, fever or chills. fatigue     HENT:  Denies any hearing loss, epistaxis, hoarseness, or difficulty speaking.     Eyes: Wears eyeglasses or contact lenses     Respiratory:  Denies dyspnea with exertion,no cough, wheezing, or hemoptysis.     Cardiovascular: Negative for palpations, chest pain, orthopnea, PND    Gastrointestinal:  Denies change in bowel habits, dyspepsia, ulcer disease, hematochezia, or melena.     Endocrine: Negative for cold intolerance, heat intolerance, polydipsia, polyphagia and polyuria.     Genitourinary: Negative.      Musculoskeletal: Denies any history of " arthritic symptoms or back problems     Skin:  Denies any change in hair or nails, rashes, or skin lesions.     Allergic/Immunologic: Negative.  Negative for environmental allergies, food allergies and immunocompromised state.     Neurological:  Denies any history of recurrent headaches, strokes, TIA, or seizure disorder.     Hematological: Denies any food allergies, seasonal allergies, bleeding disorders, or lymphadenopathy.     Psychiatric/Behavioral: Denies any history of depression, substance abuse, or change in cognitive function.         Physical Exam     Constitutional: Cooperative, alert and oriented, in no acute distress.     HENT:   Head: Normocephalic, normal hair patterns, no masses or tenderness.  Ears, Nose, and Throat: No gross abnormalities. No pallor or cyanosis.   Eyes: EOMS intact, PERRL, conjunctivae and lids unremarkable. Fundoscopic exam and visual fields not performed.   Neck: No palpable masses or adenopathy, no thyromegaly, no JVD, carotid pulses are full and equal bilaterally and without  Bruits.     Cardiovascular: Irregular rhythm, normal prosthetic valve sounds, no S3 or S4.  No murmurs, gallops, or rubs detected.     Pulmonary/Chest: Chest: normal symmetry,  normal respiratory excursion, no intercostal retraction, no use of accessory muscles.            Pulmonary: Normal breath sounds. No rales or ronchi.    Abdominal: Abdomen soft, bowel sounds normoactive, no masses, no hepatosplenomegaly, non-tender, no bruits.     Musculoskeletal: No deformities, clubbing, cyanosis, erythema, or edema observed.    Neurological: No gross motor or sensory deficits noted, affect appropriate, oriented to time, person, place.     Skin: Warm and dry to the touch, no apparent skin lesions or masses noted.     Psychiatric: She has a normal mood and affect. Her behavior is normal. Judgment and thought content normal.         Procedures      Lab Results   Component Value Date    WBC 10.02 (H) 03/16/2018     HGB 13.8 03/16/2018    HCT 41.1 03/16/2018    MCV 91.7 03/16/2018     03/16/2018     Lab Results   Component Value Date    GLUCOSE 113 (H) 03/16/2018    BUN 23 (H) 03/16/2018    CREATININE 0.78 03/16/2018    EGFRIFNONA 71 03/16/2018    BCR 29.5 (H) 03/16/2018    CO2 28.0 03/16/2018    CALCIUM 9.3 03/16/2018    ALBUMIN 3.50 03/16/2018    AST 21 03/16/2018    ALT 31 03/16/2018     Lab Results   Component Value Date    CHOL 234 (H) 01/27/2017     Lab Results   Component Value Date    TRIG 199 01/27/2017    TRIG 190 09/01/2015     Lab Results   Component Value Date    HDL 52 (L) 01/27/2017     No components found for: LDLCALC  Lab Results   Component Value Date     (H) 01/27/2017     09/01/2015     No results found for: HDLLDLRATIO  No components found for: CHOLHDL  No results found for: HGBA1C  Lab Results   Component Value Date    TSH 0.080 (L) 03/16/2018           ASSESSMENT AND PLAN  Ms. Hogan is stable with regards to her heart with no definite evidence of angina or congestive heart failure.  Her prosthetic valve is functioning well clinically.   Her current dose of metoprolol tartrate and warfarin have been continued.  An echocardiogram to assess left ventricular and prosthetic valve function is being arranged.    Diagnoses and all orders for this visit:    1. Atrial fibrillation, unspecified type (CMS/HCC) (Primary)  -     ECG 12 Lead  -     Adult Transthoracic Echo Complete W/ Cont if Necessary Per Protocol; Future    2. Nonrheumatic mitral valve disorder, unspecified  -     Adult Transthoracic Echo Complete W/ Cont if Necessary Per Protocol; Future    3. Essential hypertension  -     Adult Transthoracic Echo Complete W/ Cont if Necessary Per Protocol; Future    4. Mixed hyperlipidemia  -     Adult Transthoracic Echo Complete W/ Cont if Necessary Per Protocol; Future    5. S/P MVR (mitral valve repair)  -     Adult Transthoracic Echo Complete W/ Cont if Necessary Per Protocol;  Future        Patient's Body mass index is 33.65 kg/m². BMI is above normal parameters. Recommendations include: exercise counseling and nutrition counseling.  Patient is a non-smoker    Kg Adams MD  11/13/2020  11:52 CST

## 2020-11-13 NOTE — PROGRESS NOTES
Pt here seeing Dr Brown. Pt denies med changes or bleeding problems. Dose adjusted today only and pt instructed to hold green veggies for 3 days; pt verbalized. Patient instructed regarding medication; results given and questions answered. Nutritional counseling given.  Dietary factors affecting therapy addressed.  Patient instructed to monitor for excessive bruising or bleeding. Findings reported by Ivanna Hahn RN.    Today's INR is   Lab Results - Last 18 Months   Lab Units 11/13/20   INR  1.90*

## 2020-12-08 ENCOUNTER — DOCUMENTATION (OUTPATIENT)
Dept: CARDIOLOGY | Facility: CLINIC | Age: 85
End: 2020-12-08

## 2020-12-08 NOTE — PROGRESS NOTES
Echo results per Dr Brown    EF unchanged. Mechanical valve looks good.     Patient notified and she had no questions.

## 2020-12-15 ENCOUNTER — ANTICOAGULATION VISIT (OUTPATIENT)
Dept: CARDIAC SURGERY | Facility: CLINIC | Age: 85
End: 2020-12-15

## 2020-12-15 VITALS — OXYGEN SATURATION: 98 % | HEART RATE: 70 BPM

## 2020-12-15 DIAGNOSIS — I48.91 ATRIAL FIBRILLATION, UNSPECIFIED TYPE (HCC): ICD-10-CM

## 2020-12-15 DIAGNOSIS — Z51.81 ENCOUNTER FOR THERAPEUTIC DRUG MONITORING: ICD-10-CM

## 2020-12-15 DIAGNOSIS — Z98.890 S/P MVR (MITRAL VALVE REPAIR): ICD-10-CM

## 2020-12-15 DIAGNOSIS — Z79.01 LONG TERM CURRENT USE OF ANTICOAGULANT THERAPY: ICD-10-CM

## 2020-12-15 LAB — INR PPP: 2.3 (ref 0.9–1.1)

## 2020-12-15 PROCEDURE — 36416 COLLJ CAPILLARY BLOOD SPEC: CPT | Performed by: NURSE PRACTITIONER

## 2020-12-15 PROCEDURE — 85610 PROTHROMBIN TIME: CPT | Performed by: NURSE PRACTITIONER

## 2020-12-15 PROCEDURE — 93793 ANTICOAG MGMT PT WARFARIN: CPT | Performed by: NURSE PRACTITIONER

## 2021-01-12 ENCOUNTER — ANTICOAGULATION VISIT (OUTPATIENT)
Dept: CARDIAC SURGERY | Facility: CLINIC | Age: 86
End: 2021-01-12

## 2021-01-12 VITALS — HEART RATE: 56 BPM | OXYGEN SATURATION: 99 %

## 2021-01-12 DIAGNOSIS — Z79.01 LONG TERM CURRENT USE OF ANTICOAGULANT THERAPY: ICD-10-CM

## 2021-01-12 DIAGNOSIS — Z98.890 S/P MVR (MITRAL VALVE REPAIR): ICD-10-CM

## 2021-01-12 DIAGNOSIS — Z51.81 ENCOUNTER FOR THERAPEUTIC DRUG MONITORING: ICD-10-CM

## 2021-01-12 DIAGNOSIS — I48.91 ATRIAL FIBRILLATION, UNSPECIFIED TYPE (HCC): ICD-10-CM

## 2021-01-12 LAB — INR PPP: 2 (ref 0.9–1.1)

## 2021-01-12 PROCEDURE — 93793 ANTICOAG MGMT PT WARFARIN: CPT | Performed by: NURSE PRACTITIONER

## 2021-01-12 PROCEDURE — 85610 PROTHROMBIN TIME: CPT | Performed by: NURSE PRACTITIONER

## 2021-01-12 PROCEDURE — 36416 COLLJ CAPILLARY BLOOD SPEC: CPT | Performed by: NURSE PRACTITIONER

## 2021-01-12 NOTE — PROGRESS NOTES
Patient states no med changes or bleeding problems or unexplained bruising. Patient instructed to continue current dosing schedule. Verbalizes understanding. Will recheck in 5 weeks. Patient instructed regarding medication; results given and questions answered. Nutritional counseling given.  Dietary factors affecting therapy addressed.  Patient instructed to monitor for excessive bruising or bleeding. Findings reported by Ivanna Hahn RN.    Today's INR is   Lab Results - Last 18 Months   Lab Units 01/12/21   INR  2.00*

## 2021-02-22 ENCOUNTER — ANTICOAGULATION VISIT (OUTPATIENT)
Dept: CARDIAC SURGERY | Facility: CLINIC | Age: 86
End: 2021-02-22

## 2021-02-22 VITALS — OXYGEN SATURATION: 94 % | HEART RATE: 67 BPM

## 2021-02-22 DIAGNOSIS — I48.91 ATRIAL FIBRILLATION, UNSPECIFIED TYPE (HCC): ICD-10-CM

## 2021-02-22 DIAGNOSIS — Z51.81 ENCOUNTER FOR THERAPEUTIC DRUG MONITORING: ICD-10-CM

## 2021-02-22 DIAGNOSIS — Z98.890 S/P MVR (MITRAL VALVE REPAIR): ICD-10-CM

## 2021-02-22 DIAGNOSIS — Z79.01 LONG TERM CURRENT USE OF ANTICOAGULANT THERAPY: ICD-10-CM

## 2021-02-22 LAB — INR PPP: 2.5 (ref 0.9–1.1)

## 2021-02-22 PROCEDURE — 36416 COLLJ CAPILLARY BLOOD SPEC: CPT | Performed by: NURSE PRACTITIONER

## 2021-02-22 PROCEDURE — 93793 ANTICOAG MGMT PT WARFARIN: CPT | Performed by: NURSE PRACTITIONER

## 2021-02-22 PROCEDURE — 85610 PROTHROMBIN TIME: CPT | Performed by: NURSE PRACTITIONER

## 2021-02-22 NOTE — PROGRESS NOTES
Patient states no med changes or bleeding problems or unexplained bruising. Patient instructed to continue current dosing schedule. Verbalizes understanding. Will recheck in 5 wks. Patient instructed regarding medication; results given and questions answered. Nutritional counseling given.  Dietary factors affecting therapy addressed.  Patient instructed to monitor for excessive bruising or bleeding.  Findings reported by Malena Ramon RN.   Today's INR is   Lab Results - Last 18 Months   Lab Units 02/22/21   INR  2.50*

## 2021-03-08 DIAGNOSIS — Z79.01 LONG TERM CURRENT USE OF ANTICOAGULANT THERAPY: Primary | ICD-10-CM

## 2021-03-08 RX ORDER — WARFARIN SODIUM 2.5 MG/1
TABLET ORAL
Qty: 35 TABLET | Refills: 5 | Status: SHIPPED | OUTPATIENT
Start: 2021-03-08 | End: 2021-11-01

## 2021-03-23 ENCOUNTER — BULK ORDERING (OUTPATIENT)
Dept: CASE MANAGEMENT | Facility: OTHER | Age: 86
End: 2021-03-23

## 2021-03-23 DIAGNOSIS — Z23 IMMUNIZATION DUE: ICD-10-CM

## 2021-03-30 ENCOUNTER — ANTICOAGULATION VISIT (OUTPATIENT)
Dept: CARDIAC SURGERY | Facility: CLINIC | Age: 86
End: 2021-03-30

## 2021-03-30 VITALS — HEART RATE: 66 BPM | OXYGEN SATURATION: 98 %

## 2021-03-30 DIAGNOSIS — I48.91 ATRIAL FIBRILLATION, UNSPECIFIED TYPE (HCC): ICD-10-CM

## 2021-03-30 DIAGNOSIS — Z51.81 ENCOUNTER FOR THERAPEUTIC DRUG MONITORING: ICD-10-CM

## 2021-03-30 DIAGNOSIS — Z79.01 LONG TERM CURRENT USE OF ANTICOAGULANT THERAPY: ICD-10-CM

## 2021-03-30 DIAGNOSIS — Z98.890 S/P MVR (MITRAL VALVE REPAIR): ICD-10-CM

## 2021-03-30 LAB — INR PPP: 2.1 (ref 0.9–1.1)

## 2021-03-30 PROCEDURE — 36416 COLLJ CAPILLARY BLOOD SPEC: CPT | Performed by: NURSE PRACTITIONER

## 2021-03-30 PROCEDURE — 93793 ANTICOAG MGMT PT WARFARIN: CPT | Performed by: NURSE PRACTITIONER

## 2021-03-30 PROCEDURE — 85610 PROTHROMBIN TIME: CPT | Performed by: NURSE PRACTITIONER

## 2021-03-30 NOTE — PROGRESS NOTES
Patient states no med changes or bleeding problems or unexplained bruising. Patient instructed to continue current dosing schedule. Verbalizes understanding. Will recheck in 5 wks. Patient instructed regarding medication; results given and questions answered. Nutritional counseling given.  Dietary factors affecting therapy addressed.  Patient instructed to monitor for excessive bruising or bleeding.  Findings reported by Malena Ramon RN.   Today's INR is   Lab Results - Last 18 Months   Lab Units 03/30/21  0000   INR  2.10*

## 2021-05-04 ENCOUNTER — ANTICOAGULATION VISIT (OUTPATIENT)
Dept: CARDIAC SURGERY | Facility: CLINIC | Age: 86
End: 2021-05-04

## 2021-05-04 VITALS — HEART RATE: 68 BPM | OXYGEN SATURATION: 98 %

## 2021-05-04 DIAGNOSIS — Z98.890 S/P MVR (MITRAL VALVE REPAIR): Primary | ICD-10-CM

## 2021-05-04 DIAGNOSIS — I48.91 ATRIAL FIBRILLATION, UNSPECIFIED TYPE (HCC): ICD-10-CM

## 2021-05-04 DIAGNOSIS — Z51.81 ENCOUNTER FOR THERAPEUTIC DRUG MONITORING: ICD-10-CM

## 2021-05-04 DIAGNOSIS — Z79.01 LONG TERM CURRENT USE OF ANTICOAGULANT THERAPY: ICD-10-CM

## 2021-05-04 LAB — INR PPP: 1.9 (ref 0.9–1.1)

## 2021-05-04 PROCEDURE — 93793 ANTICOAG MGMT PT WARFARIN: CPT | Performed by: NURSE PRACTITIONER

## 2021-05-04 PROCEDURE — 85610 PROTHROMBIN TIME: CPT | Performed by: NURSE PRACTITIONER

## 2021-05-04 PROCEDURE — 36416 COLLJ CAPILLARY BLOOD SPEC: CPT | Performed by: NURSE PRACTITIONER

## 2021-05-04 NOTE — PROGRESS NOTES
Pt states she thinks she missed a coumadin dose but she is unsure when.  Pt denies new medication or bleeding issues.  Adjusted coumadin dose for today only and instructed pt to limit green intake for two days.  Recheck INR in two weeks.  Pt verbalizes.  Patient instructed regarding medication; results given and questions answered. Nutritional counseling given.  Dietary factors affecting therapy addressed.  Patient instructed to monitor for excessive bruising or bleeding.  Findings reported by Malena Ramon RN.   Today's INR is   Lab Results - Last 18 Months   Lab Units 05/04/21  0000   INR  1.90*

## 2021-05-18 ENCOUNTER — ANTICOAGULATION VISIT (OUTPATIENT)
Dept: CARDIAC SURGERY | Facility: CLINIC | Age: 86
End: 2021-05-18

## 2021-05-18 VITALS — HEART RATE: 66 BPM | OXYGEN SATURATION: 98 %

## 2021-05-18 DIAGNOSIS — I48.91 ATRIAL FIBRILLATION, UNSPECIFIED TYPE (HCC): ICD-10-CM

## 2021-05-18 DIAGNOSIS — Z51.81 ENCOUNTER FOR THERAPEUTIC DRUG MONITORING: ICD-10-CM

## 2021-05-18 DIAGNOSIS — Z79.01 LONG TERM CURRENT USE OF ANTICOAGULANT THERAPY: ICD-10-CM

## 2021-05-18 DIAGNOSIS — Z98.890 S/P MVR (MITRAL VALVE REPAIR): Primary | ICD-10-CM

## 2021-05-18 LAB — INR PPP: 2.1 (ref 0.9–1.1)

## 2021-05-18 PROCEDURE — 36416 COLLJ CAPILLARY BLOOD SPEC: CPT | Performed by: NURSE PRACTITIONER

## 2021-05-18 PROCEDURE — 93793 ANTICOAG MGMT PT WARFARIN: CPT | Performed by: NURSE PRACTITIONER

## 2021-05-18 PROCEDURE — 85610 PROTHROMBIN TIME: CPT | Performed by: NURSE PRACTITIONER

## 2021-05-18 NOTE — PROGRESS NOTES
Patient states no med changes or bleeding problems or unexplained bruising. Patient instructed to continue current dosing schedule. Verbalizes understanding. Will recheck 1 month.  Patient instructed regarding medication; results given and questions answered. Nutritional counseling given.  Dietary factors affecting therapy addressed.  Patient instructed to monitor for excessive bruising or bleeding. Findings reported by Ivanna Hahn RN.    Today's INR is   Lab Results - Last 18 Months   Lab Units 05/18/21  0000   INR  2.10*

## 2021-06-15 ENCOUNTER — ANTICOAGULATION VISIT (OUTPATIENT)
Dept: CARDIAC SURGERY | Facility: CLINIC | Age: 86
End: 2021-06-15

## 2021-06-15 VITALS — HEART RATE: 65 BPM | OXYGEN SATURATION: 98 %

## 2021-06-15 DIAGNOSIS — Z98.890 S/P MVR (MITRAL VALVE REPAIR): Primary | ICD-10-CM

## 2021-06-15 DIAGNOSIS — Z51.81 ENCOUNTER FOR THERAPEUTIC DRUG MONITORING: ICD-10-CM

## 2021-06-15 DIAGNOSIS — Z79.01 LONG TERM CURRENT USE OF ANTICOAGULANT THERAPY: ICD-10-CM

## 2021-06-15 DIAGNOSIS — I48.91 ATRIAL FIBRILLATION, UNSPECIFIED TYPE (HCC): ICD-10-CM

## 2021-06-15 LAB — INR PPP: 2 (ref 0.9–1.1)

## 2021-06-15 PROCEDURE — 36416 COLLJ CAPILLARY BLOOD SPEC: CPT | Performed by: NURSE PRACTITIONER

## 2021-06-15 PROCEDURE — 93793 ANTICOAG MGMT PT WARFARIN: CPT | Performed by: NURSE PRACTITIONER

## 2021-06-15 PROCEDURE — 85610 PROTHROMBIN TIME: CPT | Performed by: NURSE PRACTITIONER

## 2021-06-15 NOTE — PROGRESS NOTES
Patient states no med changes or bleeding problems or unexplained bruising. Patient instructed to continue current dosing schedule. Verbalizes understanding. Will recheck 1 month. Patient instructed regarding medication; results given and questions answered. Nutritional counseling given.  Dietary factors affecting therapy addressed.  Patient instructed to monitor for excessive bruising or bleeding. Findings reported by Ivanna Hahn RN.    Today's INR is   Lab Results - Last 18 Months   Lab Units 06/15/21  0000   INR  2.00*

## 2021-07-13 ENCOUNTER — ANTICOAGULATION VISIT (OUTPATIENT)
Dept: CARDIAC SURGERY | Facility: CLINIC | Age: 86
End: 2021-07-13

## 2021-07-13 VITALS — HEART RATE: 50 BPM | OXYGEN SATURATION: 94 %

## 2021-07-13 DIAGNOSIS — Z51.81 ENCOUNTER FOR THERAPEUTIC DRUG MONITORING: ICD-10-CM

## 2021-07-13 DIAGNOSIS — Z79.01 LONG TERM CURRENT USE OF ANTICOAGULANT THERAPY: ICD-10-CM

## 2021-07-13 DIAGNOSIS — I48.91 ATRIAL FIBRILLATION, UNSPECIFIED TYPE (HCC): ICD-10-CM

## 2021-07-13 DIAGNOSIS — Z98.890 S/P MVR (MITRAL VALVE REPAIR): Primary | ICD-10-CM

## 2021-07-13 LAB — INR PPP: 1.5 (ref 0.9–1.1)

## 2021-07-13 PROCEDURE — 85610 PROTHROMBIN TIME: CPT | Performed by: NURSE PRACTITIONER

## 2021-07-13 PROCEDURE — 36416 COLLJ CAPILLARY BLOOD SPEC: CPT | Performed by: NURSE PRACTITIONER

## 2021-07-13 PROCEDURE — 93793 ANTICOAG MGMT PT WARFARIN: CPT | Performed by: NURSE PRACTITIONER

## 2021-07-13 NOTE — PROGRESS NOTES
Pt states she missed a coumadin dose two days ago by mistake.  Pt denies new medications or bleeding issues.  Adjusted dose and instructed pt to limit green intake for four days.  Based on TTR, will recheck INR in one month.  Pt verbalized instructions.  Patient instructed regarding medication; results given and questions answered. Nutritional counseling given.  Dietary factors affecting therapy addressed.  Patient instructed to monitor for excessive bruising or bleeding.  Findings reported by Malena Ramon RN.   Today's INR is   Lab Results - Last 18 Months   Lab Units 07/13/21  0000   INR  1.50*

## 2021-08-10 ENCOUNTER — DOCUMENTATION (OUTPATIENT)
Dept: CARDIAC SURGERY | Facility: CLINIC | Age: 86
End: 2021-08-10

## 2021-09-15 ENCOUNTER — DOCUMENTATION (OUTPATIENT)
Dept: CARDIAC SURGERY | Facility: CLINIC | Age: 86
End: 2021-09-15

## 2021-09-21 ENCOUNTER — ANTICOAGULATION VISIT (OUTPATIENT)
Dept: CARDIAC SURGERY | Facility: CLINIC | Age: 86
End: 2021-09-21

## 2021-09-21 DIAGNOSIS — Z79.01 LONG TERM CURRENT USE OF ANTICOAGULANT THERAPY: ICD-10-CM

## 2021-09-21 DIAGNOSIS — I48.91 ATRIAL FIBRILLATION, UNSPECIFIED TYPE (HCC): ICD-10-CM

## 2021-09-21 DIAGNOSIS — Z51.81 ENCOUNTER FOR THERAPEUTIC DRUG MONITORING: ICD-10-CM

## 2021-09-21 DIAGNOSIS — Z98.890 S/P MVR (MITRAL VALVE REPAIR): Primary | ICD-10-CM

## 2021-09-21 LAB — INR PPP: 1.8 (ref 0.9–1.1)

## 2021-09-21 PROCEDURE — 36416 COLLJ CAPILLARY BLOOD SPEC: CPT | Performed by: NURSE PRACTITIONER

## 2021-09-21 PROCEDURE — 93793 ANTICOAG MGMT PT WARFARIN: CPT | Performed by: NURSE PRACTITIONER

## 2021-09-21 PROCEDURE — 85610 PROTHROMBIN TIME: CPT | Performed by: NURSE PRACTITIONER

## 2021-09-21 NOTE — PROGRESS NOTES
Pt overdue for INR today.  Pt states she may have missed a coumadin dose, she is unsure.  Pt states maintenance medications have not changed; no bleeding issues.  Adjusted coumadin dose for today only and instructed pt to limit green intake for two days.  Recheck INR in one month.  Instructions verbalized.  Patient instructed regarding medication; results given and questions answered. Nutritional counseling given.  Dietary factors affecting therapy addressed.  Patient instructed to monitor for excessive bruising or bleeding.  Findings reported by Malena Ramon RN.   Today's INR is   Lab Results - Last 18 Months   Lab Units 09/21/21  0000   INR  1.80*

## 2021-10-19 ENCOUNTER — ANTICOAGULATION VISIT (OUTPATIENT)
Dept: CARDIAC SURGERY | Facility: CLINIC | Age: 86
End: 2021-10-19

## 2021-10-19 VITALS — OXYGEN SATURATION: 95 % | HEART RATE: 83 BPM

## 2021-10-19 DIAGNOSIS — Z79.01 LONG TERM CURRENT USE OF ANTICOAGULANT THERAPY: ICD-10-CM

## 2021-10-19 DIAGNOSIS — Z98.890 S/P MVR (MITRAL VALVE REPAIR): Primary | ICD-10-CM

## 2021-10-19 DIAGNOSIS — I48.91 ATRIAL FIBRILLATION, UNSPECIFIED TYPE (HCC): ICD-10-CM

## 2021-10-19 DIAGNOSIS — Z51.81 ENCOUNTER FOR THERAPEUTIC DRUG MONITORING: ICD-10-CM

## 2021-10-19 LAB — INR PPP: 2.9 (ref 0.9–1.1)

## 2021-10-19 PROCEDURE — 85610 PROTHROMBIN TIME: CPT | Performed by: NURSE PRACTITIONER

## 2021-10-19 PROCEDURE — 93793 ANTICOAG MGMT PT WARFARIN: CPT | Performed by: NURSE PRACTITIONER

## 2021-10-19 PROCEDURE — 36416 COLLJ CAPILLARY BLOOD SPEC: CPT | Performed by: NURSE PRACTITIONER

## 2021-11-01 DIAGNOSIS — Z79.01 LONG TERM CURRENT USE OF ANTICOAGULANT THERAPY: ICD-10-CM

## 2021-11-01 RX ORDER — WARFARIN SODIUM 2.5 MG/1
TABLET ORAL
Qty: 35 TABLET | Refills: 5 | Status: SHIPPED | OUTPATIENT
Start: 2021-11-01 | End: 2022-06-27 | Stop reason: SDUPTHER

## 2021-11-16 ENCOUNTER — ANTICOAGULATION VISIT (OUTPATIENT)
Dept: CARDIAC SURGERY | Facility: CLINIC | Age: 86
End: 2021-11-16

## 2021-11-16 VITALS — OXYGEN SATURATION: 97 % | HEART RATE: 92 BPM

## 2021-11-16 DIAGNOSIS — Z79.01 LONG TERM CURRENT USE OF ANTICOAGULANT THERAPY: ICD-10-CM

## 2021-11-16 DIAGNOSIS — Z51.81 ENCOUNTER FOR THERAPEUTIC DRUG MONITORING: ICD-10-CM

## 2021-11-16 DIAGNOSIS — Z98.890 S/P MVR (MITRAL VALVE REPAIR): Primary | ICD-10-CM

## 2021-11-16 DIAGNOSIS — I48.91 ATRIAL FIBRILLATION, UNSPECIFIED TYPE (HCC): ICD-10-CM

## 2021-11-16 LAB — INR PPP: 2.2 (ref 0.9–1.1)

## 2021-11-16 PROCEDURE — 93793 ANTICOAG MGMT PT WARFARIN: CPT | Performed by: NURSE PRACTITIONER

## 2021-11-16 PROCEDURE — 85610 PROTHROMBIN TIME: CPT | Performed by: NURSE PRACTITIONER

## 2021-11-16 PROCEDURE — 36416 COLLJ CAPILLARY BLOOD SPEC: CPT | Performed by: NURSE PRACTITIONER

## 2021-11-16 NOTE — PROGRESS NOTES
Patient states no med changes or bleeding problems or unexplained bruising. Patient instructed to continue current dosing schedule. Verbalizes understanding. Will recheck 1 month.   Patient instructed regarding medication; results given and questions answered. Nutritional counseling given.  Dietary factors affecting therapy addressed.  Patient instructed to monitor for excessive bruising or bleeding.  Findings reported by Malena Ramon RN.   Today's INR is Lab Results - Last 18 Months   Lab Units 11/16/21  0000   INR  2.20*

## 2021-12-14 ENCOUNTER — DOCUMENTATION (OUTPATIENT)
Dept: CARDIAC SURGERY | Facility: CLINIC | Age: 86
End: 2021-12-14

## 2021-12-15 ENCOUNTER — ANTICOAGULATION VISIT (OUTPATIENT)
Dept: CARDIAC SURGERY | Facility: CLINIC | Age: 86
End: 2021-12-15

## 2021-12-15 VITALS — OXYGEN SATURATION: 98 % | HEART RATE: 81 BPM

## 2021-12-15 DIAGNOSIS — I48.91 ATRIAL FIBRILLATION, UNSPECIFIED TYPE (HCC): ICD-10-CM

## 2021-12-15 DIAGNOSIS — Z79.01 LONG TERM CURRENT USE OF ANTICOAGULANT THERAPY: ICD-10-CM

## 2021-12-15 DIAGNOSIS — Z98.890 S/P MVR (MITRAL VALVE REPAIR): Primary | ICD-10-CM

## 2021-12-15 DIAGNOSIS — Z51.81 ENCOUNTER FOR THERAPEUTIC DRUG MONITORING: ICD-10-CM

## 2021-12-15 LAB — INR PPP: 2 (ref 0.9–1.1)

## 2021-12-15 PROCEDURE — 85610 PROTHROMBIN TIME: CPT | Performed by: NURSE PRACTITIONER

## 2021-12-15 PROCEDURE — 93793 ANTICOAG MGMT PT WARFARIN: CPT | Performed by: NURSE PRACTITIONER

## 2021-12-15 PROCEDURE — 36416 COLLJ CAPILLARY BLOOD SPEC: CPT | Performed by: NURSE PRACTITIONER

## 2021-12-15 NOTE — PROGRESS NOTES
Patient states no med changes or bleeding problems or unexplained bruising. Patient instructed to continue current dosing schedule. Verbalizes understanding. Will recheck 1 month. Patient instructed regarding medication; results given and questions answered. Nutritional counseling given.  Dietary factors affecting therapy addressed.  Patient instructed to monitor for excessive bruising or bleeding.  Findings reported by Ivanna Hahn RN.    Today's INR is Lab Results - Last 18 Months   Lab Units 12/15/21  0000   INR  2.00*

## 2022-01-11 ENCOUNTER — ANTICOAGULATION VISIT (OUTPATIENT)
Dept: CARDIAC SURGERY | Facility: CLINIC | Age: 87
End: 2022-01-11

## 2022-01-11 VITALS — HEART RATE: 68 BPM | OXYGEN SATURATION: 96 %

## 2022-01-11 DIAGNOSIS — Z98.890 S/P MVR (MITRAL VALVE REPAIR): Primary | ICD-10-CM

## 2022-01-11 DIAGNOSIS — Z79.01 LONG TERM CURRENT USE OF ANTICOAGULANT THERAPY: ICD-10-CM

## 2022-01-11 DIAGNOSIS — Z51.81 ENCOUNTER FOR THERAPEUTIC DRUG MONITORING: ICD-10-CM

## 2022-01-11 DIAGNOSIS — I48.91 ATRIAL FIBRILLATION, UNSPECIFIED TYPE: ICD-10-CM

## 2022-01-11 LAB — INR PPP: 2.8 (ref 0.9–1.1)

## 2022-01-11 PROCEDURE — 36416 COLLJ CAPILLARY BLOOD SPEC: CPT | Performed by: NURSE PRACTITIONER

## 2022-01-11 PROCEDURE — 93793 ANTICOAG MGMT PT WARFARIN: CPT | Performed by: NURSE PRACTITIONER

## 2022-01-11 PROCEDURE — 85610 PROTHROMBIN TIME: CPT | Performed by: NURSE PRACTITIONER

## 2022-01-11 NOTE — PROGRESS NOTES
Patient states no med changes or bleeding problems or unexplained bruising. Patient instructed to continue current dosing schedule. Verbalizes understanding. Will recheck 1 month.  Patient instructed regarding medication; results given and questions answered. Nutritional counseling given.  Dietary factors affecting therapy addressed.  Patient instructed to monitor for excessive bruising or bleeding.  Findings reported by Ivanna Hahn RN.    Today's INR is Lab Results - Last 18 Months   Lab Units 01/11/22  0000   INR  2.80*

## 2022-02-08 ENCOUNTER — ANTICOAGULATION VISIT (OUTPATIENT)
Dept: CARDIAC SURGERY | Facility: CLINIC | Age: 87
End: 2022-02-08

## 2022-02-08 VITALS — OXYGEN SATURATION: 96 % | HEART RATE: 85 BPM

## 2022-02-08 DIAGNOSIS — Z51.81 ENCOUNTER FOR THERAPEUTIC DRUG MONITORING: ICD-10-CM

## 2022-02-08 DIAGNOSIS — Z98.890 S/P MVR (MITRAL VALVE REPAIR): Primary | ICD-10-CM

## 2022-02-08 DIAGNOSIS — Z79.01 LONG TERM CURRENT USE OF ANTICOAGULANT THERAPY: ICD-10-CM

## 2022-02-08 DIAGNOSIS — I48.91 ATRIAL FIBRILLATION, UNSPECIFIED TYPE: ICD-10-CM

## 2022-02-08 LAB — INR PPP: 2.5 (ref 0.9–1.1)

## 2022-02-08 PROCEDURE — 85610 PROTHROMBIN TIME: CPT | Performed by: NURSE PRACTITIONER

## 2022-02-08 PROCEDURE — 93793 ANTICOAG MGMT PT WARFARIN: CPT | Performed by: NURSE PRACTITIONER

## 2022-02-08 PROCEDURE — 36416 COLLJ CAPILLARY BLOOD SPEC: CPT | Performed by: NURSE PRACTITIONER

## 2022-02-08 NOTE — PROGRESS NOTES
Patient states no med changes or bleeding problems or unexplained bruising. Patient instructed to continue current dosing schedule. Verbalizes understanding. Will recheck in 6 weeks. Patient instructed regarding medication; results given and questions answered. Nutritional counseling given.  Dietary factors affecting therapy addressed.  Patient instructed to monitor for excessive bruising or bleeding. Findings reported by Ivanna Hahn RN.    Today's INR is Lab Results - Last 18 Months   Lab Units 02/08/22  0000   INR  2.50*

## 2022-03-22 ENCOUNTER — DOCUMENTATION (OUTPATIENT)
Dept: CARDIAC SURGERY | Facility: CLINIC | Age: 87
End: 2022-03-22

## 2022-03-30 ENCOUNTER — ANTICOAGULATION VISIT (OUTPATIENT)
Dept: CARDIAC SURGERY | Facility: CLINIC | Age: 87
End: 2022-03-30

## 2022-03-30 VITALS — OXYGEN SATURATION: 97 % | HEART RATE: 76 BPM

## 2022-03-30 DIAGNOSIS — I48.91 ATRIAL FIBRILLATION, UNSPECIFIED TYPE: ICD-10-CM

## 2022-03-30 DIAGNOSIS — Z79.01 LONG TERM CURRENT USE OF ANTICOAGULANT THERAPY: ICD-10-CM

## 2022-03-30 DIAGNOSIS — Z51.81 ENCOUNTER FOR THERAPEUTIC DRUG MONITORING: ICD-10-CM

## 2022-03-30 DIAGNOSIS — Z98.890 S/P MVR (MITRAL VALVE REPAIR): Primary | ICD-10-CM

## 2022-03-30 LAB — INR PPP: 1.9 (ref 0.9–1.1)

## 2022-03-30 PROCEDURE — 36416 COLLJ CAPILLARY BLOOD SPEC: CPT | Performed by: NURSE PRACTITIONER

## 2022-03-30 PROCEDURE — 85610 PROTHROMBIN TIME: CPT | Performed by: NURSE PRACTITIONER

## 2022-03-30 PROCEDURE — 93793 ANTICOAG MGMT PT WARFARIN: CPT | Performed by: NURSE PRACTITIONER

## 2022-03-30 NOTE — PROGRESS NOTES
Pt denies med changes or bleeding problems. Dose adjusted today only and pt instructed to hold green veggies 2 days; pt verbalized. Patient instructed regarding medication; results given and questions answered. Nutritional counseling given.  Dietary factors affecting therapy addressed.  Patient instructed to monitor for excessive bruising or bleeding. Will recheck in 6 weeks. Findings reported by Ivanna Hahn RN.    Today's INR is Lab Results - Last 18 Months   Lab Units 03/30/22  0000   INR  1.90*

## 2022-05-10 ENCOUNTER — DOCUMENTATION (OUTPATIENT)
Dept: CARDIAC SURGERY | Facility: CLINIC | Age: 87
End: 2022-05-10

## 2022-05-17 ENCOUNTER — ANTICOAGULATION VISIT (OUTPATIENT)
Dept: CARDIAC SURGERY | Facility: CLINIC | Age: 87
End: 2022-05-17

## 2022-05-17 VITALS — OXYGEN SATURATION: 99 % | HEART RATE: 83 BPM

## 2022-05-17 DIAGNOSIS — Z79.01 LONG TERM CURRENT USE OF ANTICOAGULANT THERAPY: ICD-10-CM

## 2022-05-17 DIAGNOSIS — Z98.890 S/P MVR (MITRAL VALVE REPAIR): Primary | ICD-10-CM

## 2022-05-17 DIAGNOSIS — I48.91 ATRIAL FIBRILLATION, UNSPECIFIED TYPE: ICD-10-CM

## 2022-05-17 DIAGNOSIS — Z51.81 ENCOUNTER FOR THERAPEUTIC DRUG MONITORING: ICD-10-CM

## 2022-05-17 LAB — INR PPP: 1.2 (ref 0.9–1.1)

## 2022-05-17 PROCEDURE — 85610 PROTHROMBIN TIME: CPT | Performed by: NURSE PRACTITIONER

## 2022-05-17 PROCEDURE — 36416 COLLJ CAPILLARY BLOOD SPEC: CPT | Performed by: NURSE PRACTITIONER

## 2022-05-17 PROCEDURE — 93793 ANTICOAG MGMT PT WARFARIN: CPT | Performed by: NURSE PRACTITIONER

## 2022-05-17 NOTE — PROGRESS NOTES
Pt here today for overdue INR. Pt denies med changes, bleeding problems, missed doses, or excessive vitamin K intake. Pt  states he is afraid she is not taking her medication right. Pt states she takes her warfarin every night and that is the last thing she does before going to sleep. Dose adjusted today only and pt instructed to hold green veggies 4 days; pt verbalized. Patient instructed regarding medication; results given and questions answered. Nutritional counseling given.  Dietary factors affecting therapy addressed.  Patient instructed to monitor for excessive bruising or bleeding. Will recheck next week. Findings reported by Ivanna Hahn RN.    Today's INR is Lab Results - Last 18 Months   Lab Units 05/17/22  0000   INR  1.20*

## 2022-05-24 ENCOUNTER — ANTICOAGULATION VISIT (OUTPATIENT)
Dept: CARDIAC SURGERY | Facility: CLINIC | Age: 87
End: 2022-05-24

## 2022-05-24 VITALS — OXYGEN SATURATION: 98 % | HEART RATE: 63 BPM

## 2022-05-24 DIAGNOSIS — Z51.81 ENCOUNTER FOR THERAPEUTIC DRUG MONITORING: ICD-10-CM

## 2022-05-24 DIAGNOSIS — Z79.01 LONG TERM CURRENT USE OF ANTICOAGULANT THERAPY: ICD-10-CM

## 2022-05-24 DIAGNOSIS — I48.91 ATRIAL FIBRILLATION, UNSPECIFIED TYPE: ICD-10-CM

## 2022-05-24 DIAGNOSIS — Z98.890 S/P MVR (MITRAL VALVE REPAIR): Primary | ICD-10-CM

## 2022-05-24 LAB — INR PPP: 2 (ref 0.9–1.1)

## 2022-05-24 PROCEDURE — 36416 COLLJ CAPILLARY BLOOD SPEC: CPT | Performed by: NURSE PRACTITIONER

## 2022-05-24 PROCEDURE — 85610 PROTHROMBIN TIME: CPT | Performed by: NURSE PRACTITIONER

## 2022-05-24 PROCEDURE — 93793 ANTICOAG MGMT PT WARFARIN: CPT | Performed by: NURSE PRACTITIONER

## 2022-05-24 NOTE — PROGRESS NOTES
Patient states no med changes or bleeding problems or unexplained bruising. Patient instructed to continue current dosing schedule. Verbalizes understanding. Will recheck 1 month.   Patient instructed regarding medication; results given and questions answered. Nutritional counseling given.  Dietary factors affecting therapy addressed.  Patient instructed to monitor for excessive bruising or bleeding.  Findings reported by Malena Ramon RN.   Today's INR is Lab Results - Last 18 Months   Lab Units 05/24/22  0000   INR  2.00*

## 2022-06-21 ENCOUNTER — ANTICOAGULATION VISIT (OUTPATIENT)
Dept: CARDIAC SURGERY | Facility: CLINIC | Age: 87
End: 2022-06-21

## 2022-06-21 VITALS — OXYGEN SATURATION: 98 % | HEART RATE: 102 BPM

## 2022-06-21 DIAGNOSIS — Z79.01 LONG TERM CURRENT USE OF ANTICOAGULANT THERAPY: ICD-10-CM

## 2022-06-21 DIAGNOSIS — Z51.81 ENCOUNTER FOR THERAPEUTIC DRUG MONITORING: ICD-10-CM

## 2022-06-21 DIAGNOSIS — Z98.890 S/P MVR (MITRAL VALVE REPAIR): Primary | ICD-10-CM

## 2022-06-21 DIAGNOSIS — I48.91 ATRIAL FIBRILLATION, UNSPECIFIED TYPE: ICD-10-CM

## 2022-06-21 LAB — INR PPP: 2.9 (ref 0.9–1.1)

## 2022-06-21 PROCEDURE — 36416 COLLJ CAPILLARY BLOOD SPEC: CPT | Performed by: NURSE PRACTITIONER

## 2022-06-21 PROCEDURE — 85610 PROTHROMBIN TIME: CPT | Performed by: NURSE PRACTITIONER

## 2022-06-21 PROCEDURE — 93793 ANTICOAG MGMT PT WARFARIN: CPT | Performed by: NURSE PRACTITIONER

## 2022-06-21 NOTE — PROGRESS NOTES
Patient states no med changes or bleeding problems or unexplained bruising. Patient instructed to continue current dosing schedule and eat a serving of green veggies today. Verbalizes understanding. Will recheck in 5 weeks. Patient instructed regarding medication; results given and questions answered. Nutritional counseling given.  Dietary factors affecting therapy addressed.  Patient instructed to monitor for excessive bruising or bleeding. Findings reported by Ivanna Hahn RN.    Today's INR is Lab Results - Last 18 Months   Lab Units 06/21/22  0000   INR  2.90*

## 2022-06-27 DIAGNOSIS — Z79.01 LONG TERM CURRENT USE OF ANTICOAGULANT THERAPY: ICD-10-CM

## 2022-06-27 RX ORDER — WARFARIN SODIUM 2.5 MG/1
TABLET ORAL
Qty: 35 TABLET | Refills: 5 | Status: SHIPPED | OUTPATIENT
Start: 2022-06-27

## 2022-07-26 ENCOUNTER — ANTICOAGULATION VISIT (OUTPATIENT)
Dept: CARDIAC SURGERY | Facility: CLINIC | Age: 87
End: 2022-07-26

## 2022-07-26 VITALS — OXYGEN SATURATION: 97 % | HEART RATE: 90 BPM

## 2022-07-26 DIAGNOSIS — Z79.01 LONG TERM CURRENT USE OF ANTICOAGULANT THERAPY: ICD-10-CM

## 2022-07-26 DIAGNOSIS — Z51.81 ENCOUNTER FOR THERAPEUTIC DRUG MONITORING: ICD-10-CM

## 2022-07-26 DIAGNOSIS — I48.91 ATRIAL FIBRILLATION, UNSPECIFIED TYPE: ICD-10-CM

## 2022-07-26 DIAGNOSIS — Z98.890 S/P MVR (MITRAL VALVE REPAIR): Primary | ICD-10-CM

## 2022-07-26 LAB — INR PPP: 3.2 (ref 0.9–1.1)

## 2022-07-26 PROCEDURE — 85610 PROTHROMBIN TIME: CPT | Performed by: NURSE PRACTITIONER

## 2022-07-26 PROCEDURE — 93793 ANTICOAG MGMT PT WARFARIN: CPT | Performed by: NURSE PRACTITIONER

## 2022-07-26 PROCEDURE — 36416 COLLJ CAPILLARY BLOOD SPEC: CPT | Performed by: NURSE PRACTITIONER

## 2022-07-26 NOTE — PROGRESS NOTES
Pt denied med changes or bleeding problems. Pt states she has not eaten any green veggies lately. Dose adjusted today only and pt and  were instructed to have pt eat a serving of broccoli, brussels sprouts, or okra today; they verbalized. Patient instructed regarding medication; results given and questions answered. Nutritional counseling given.  Dietary factors affecting therapy addressed.  Patient instructed to monitor for excessive bruising or bleeding. Will recheck next month. Findings reported by Ivanna Hahn RN.    Today's INR is Lab Results - Last 18 Months   Lab Units 07/26/22  0000   INR  3.20*

## 2022-08-23 ENCOUNTER — DOCUMENTATION (OUTPATIENT)
Dept: CARDIAC SURGERY | Facility: CLINIC | Age: 87
End: 2022-08-23

## 2022-08-24 ENCOUNTER — ANTICOAGULATION VISIT (OUTPATIENT)
Dept: CARDIAC SURGERY | Facility: CLINIC | Age: 87
End: 2022-08-24

## 2022-08-24 VITALS — HEART RATE: 76 BPM | OXYGEN SATURATION: 98 %

## 2022-08-24 DIAGNOSIS — I48.91 ATRIAL FIBRILLATION, UNSPECIFIED TYPE: ICD-10-CM

## 2022-08-24 DIAGNOSIS — Z79.01 LONG TERM CURRENT USE OF ANTICOAGULANT THERAPY: ICD-10-CM

## 2022-08-24 DIAGNOSIS — Z51.81 ENCOUNTER FOR THERAPEUTIC DRUG MONITORING: ICD-10-CM

## 2022-08-24 DIAGNOSIS — Z98.890 S/P MVR (MITRAL VALVE REPAIR): Primary | ICD-10-CM

## 2022-08-24 LAB — INR PPP: 3.1 (ref 0.9–1.1)

## 2022-08-24 PROCEDURE — 93793 ANTICOAG MGMT PT WARFARIN: CPT | Performed by: NURSE PRACTITIONER

## 2022-08-24 PROCEDURE — 36416 COLLJ CAPILLARY BLOOD SPEC: CPT | Performed by: NURSE PRACTITIONER

## 2022-08-24 PROCEDURE — 85610 PROTHROMBIN TIME: CPT | Performed by: NURSE PRACTITIONER

## 2022-08-24 NOTE — PROGRESS NOTES
Pt states no meds changes or bleeding problems or unexplained bruising. Pt dose adjusted and instructed to continue current diet. Pt verbalizes understanding. Will recheck in 3 weeks. Patient instructed regarding medication; results given and questions answered. Nutritional counseling given.  Dietary factors affecting therapy addressed.  Patient instructed to monitor for excessive bruising or bleeding.  Findings reported by Juany Thompson RN  Today's INR is Lab Results - Last 18 Months   Lab Units 08/24/22  0000   INR  3.10*

## 2022-09-13 ENCOUNTER — DOCUMENTATION (OUTPATIENT)
Dept: CARDIAC SURGERY | Facility: CLINIC | Age: 87
End: 2022-09-13

## 2022-09-13 NOTE — PROGRESS NOTES
PT had left a message wanting to cancel appt today and asked that we call them to reschedule. Attempted to call pt to reschedule and left voicemail. Findings reported by Ivanna Hahn RN.

## 2022-09-14 ENCOUNTER — ANTICOAGULATION VISIT (OUTPATIENT)
Dept: CARDIAC SURGERY | Facility: CLINIC | Age: 87
End: 2022-09-14

## 2022-09-14 VITALS — OXYGEN SATURATION: 92 %

## 2022-09-14 DIAGNOSIS — Z51.81 ENCOUNTER FOR THERAPEUTIC DRUG MONITORING: ICD-10-CM

## 2022-09-14 DIAGNOSIS — I48.91 ATRIAL FIBRILLATION, UNSPECIFIED TYPE: ICD-10-CM

## 2022-09-14 DIAGNOSIS — Z79.01 LONG TERM CURRENT USE OF ANTICOAGULANT THERAPY: ICD-10-CM

## 2022-09-14 DIAGNOSIS — Z98.890 S/P MVR (MITRAL VALVE REPAIR): Primary | ICD-10-CM

## 2022-09-14 LAB
INR PPP: 2.2 (ref 0.9–1.1)
INR PPP: 2.2 (ref 0.9–1.1)

## 2022-09-14 PROCEDURE — 93793 ANTICOAG MGMT PT WARFARIN: CPT | Performed by: NURSE PRACTITIONER

## 2022-09-14 PROCEDURE — 85610 PROTHROMBIN TIME: CPT | Performed by: NURSE PRACTITIONER

## 2022-09-14 PROCEDURE — 36416 COLLJ CAPILLARY BLOOD SPEC: CPT | Performed by: NURSE PRACTITIONER

## 2022-09-14 NOTE — PROGRESS NOTES
Patient states no med changes or bleeding problems or unexplained bruising. Patient instructed to continue current dosing schedule. Verbalizes understanding. Will recheck 1 month.   Patient instructed regarding medication; results given and questions answered. Nutritional counseling given.  Dietary factors affecting therapy addressed.  Patient instructed to monitor for excessive bruising or bleeding.  Findings reported by Malena Ramon RN.   Today's INR is Lab Results - Last 18 Months   Lab Units 09/14/22  0000   INR  2.20*  2.20*

## 2022-09-15 ENCOUNTER — LAB (OUTPATIENT)
Dept: LAB | Facility: HOSPITAL | Age: 87
End: 2022-09-15

## 2022-09-15 ENCOUNTER — OFFICE VISIT (OUTPATIENT)
Dept: FAMILY MEDICINE CLINIC | Facility: CLINIC | Age: 87
End: 2022-09-15

## 2022-09-15 VITALS
SYSTOLIC BLOOD PRESSURE: 136 MMHG | HEART RATE: 75 BPM | OXYGEN SATURATION: 97 % | HEIGHT: 65 IN | BODY MASS INDEX: 31.49 KG/M2 | DIASTOLIC BLOOD PRESSURE: 85 MMHG | WEIGHT: 189 LBS | TEMPERATURE: 98 F

## 2022-09-15 DIAGNOSIS — E03.9 HYPOTHYROIDISM, UNSPECIFIED TYPE: ICD-10-CM

## 2022-09-15 DIAGNOSIS — I48.19 ATRIAL FIBRILLATION, PERSISTENT: ICD-10-CM

## 2022-09-15 DIAGNOSIS — Z98.890 S/P MVR (MITRAL VALVE REPAIR): Primary | ICD-10-CM

## 2022-09-15 PROCEDURE — 99213 OFFICE O/P EST LOW 20 MIN: CPT | Performed by: NURSE PRACTITIONER

## 2022-09-15 PROCEDURE — 84443 ASSAY THYROID STIM HORMONE: CPT | Performed by: NURSE PRACTITIONER

## 2022-09-15 NOTE — PROGRESS NOTES
Subjective   Miah Hogan is a 86 y.o. female. Medication monitoring     History of Present Illness   Patient presents today to discuss medication monitoring. She is accompanied by her . Her  would like to see if we can order her at home PT/INR machine. She takes coumadin for heart valve and atrial fibrillation. He says they currently have coumadin managed [facility] in [place]. He says it is becoming more difficult for them to make it to coumadin clinic due to transportation. Pt reports history of thyroid problem but says she no longer takes medication. She denies any pain.    The following portions of the patient's history were reviewed and updated as appropriate: allergies, current medications, past family history, past medical history, past social history, past surgical history, and problem list.    Review of Systems   Constitutional: Negative for chills, fatigue and fever.   HENT: Negative for congestion, ear pain, rhinorrhea and sore throat.    Eyes: Negative for blurred vision, double vision and visual disturbance.   Respiratory: Negative for cough, chest tightness, shortness of breath and wheezing.    Cardiovascular: Negative for chest pain, palpitations and leg swelling.   Gastrointestinal: Negative for abdominal pain, diarrhea, nausea and vomiting.   Endocrine: Negative for cold intolerance and heat intolerance.   Genitourinary: Negative for difficulty urinating, dysuria, frequency and hematuria.   Musculoskeletal: Negative for arthralgias, back pain, neck pain and neck stiffness.   Skin: Negative for dry skin, pallor, rash, skin lesions and wound.   Allergic/Immunologic: Negative for environmental allergies, food allergies and immunocompromised state.   Neurological: Negative for dizziness, syncope, weakness, light-headedness, headache and confusion.   Hematological: Negative for adenopathy. Does not bruise/bleed easily.   Psychiatric/Behavioral: Negative for self-injury,  sleep disturbance, suicidal ideas, depressed mood and stress. The patient is not nervous/anxious.        Vitals:    09/15/22 1038   BP: 136/85   Pulse: 75   Temp: 98 °F (36.7 °C)   SpO2: 97%     Body mass index is 31.45 kg/m².    Objective   Physical Exam  Constitutional:       General: She is not in acute distress.     Appearance: She is well-developed. She is not ill-appearing or diaphoretic.   HENT:      Head: Normocephalic.   Eyes:      General: Lids are normal.      Conjunctiva/sclera: Conjunctivae normal.      Pupils: Pupils are equal, round, and reactive to light.   Cardiovascular:      Rate and Rhythm: Normal rate. Rhythm irregular.      Heart sounds: Normal heart sounds, S1 normal and S2 normal. No murmur heard.     Comments: History of atrial fib  Musculoskeletal:         General: Normal range of motion.      Cervical back: Full passive range of motion without pain, normal range of motion and neck supple.      Right lower leg: No edema.      Left lower leg: No edema.   Skin:     General: Skin is warm and dry.      Coloration: Skin is not pale.   Neurological:      Mental Status: She is alert and oriented to person, place, and time.      Coordination: Coordination normal.      Gait: Gait normal.   Psychiatric:         Attention and Perception: Attention normal.         Mood and Affect: Mood normal.         Speech: Speech normal.         Behavior: Behavior normal. Behavior is cooperative.           Assessment & Plan   Diagnoses and all orders for this visit:    1. S/P MVR (mitral valve repair) (Primary)    2. Atrial fibrillation, persistent (HCC)    3. Hypothyroidism, unspecified type  -     TSH    INR meter ordered per Remote INR for home monitoring.  Checking TSH due to history of hypothyroidism, will call pt with results.  If symptoms do not improve or worsen, patient was instructed to return to clinic for further evaluation.         This document has been electronically signed by EBONY Price  on  September 15, 2022 14:24 CDT

## 2022-09-16 LAB — TSH SERPL DL<=0.05 MIU/L-ACNC: 2.22 UIU/ML (ref 0.27–4.2)

## 2022-10-11 ENCOUNTER — ANTICOAGULATION VISIT (OUTPATIENT)
Dept: CARDIAC SURGERY | Facility: CLINIC | Age: 87
End: 2022-10-11

## 2022-10-11 DIAGNOSIS — I48.91 ATRIAL FIBRILLATION, UNSPECIFIED TYPE: ICD-10-CM

## 2022-10-11 DIAGNOSIS — Z51.81 ENCOUNTER FOR THERAPEUTIC DRUG MONITORING: ICD-10-CM

## 2022-10-11 DIAGNOSIS — Z98.890 S/P MVR (MITRAL VALVE REPAIR): Primary | ICD-10-CM

## 2022-10-11 DIAGNOSIS — Z79.01 LONG TERM CURRENT USE OF ANTICOAGULANT THERAPY: ICD-10-CM

## 2022-10-11 NOTE — PROGRESS NOTES
Pt  called and cancelled their appointments stating they are now going to [facility] in [place] for their warfarin management. Will resolve episode. Findings reported by Ivanna Hahn RN.

## 2023-02-01 DIAGNOSIS — Z79.01 LONG TERM CURRENT USE OF ANTICOAGULANT THERAPY: ICD-10-CM

## 2023-02-02 RX ORDER — WARFARIN SODIUM 2.5 MG/1
TABLET ORAL
Qty: 35 TABLET | Refills: 5 | OUTPATIENT
Start: 2023-02-02

## 2023-11-21 NOTE — PROGRESS NOTES
Patient states no med changes or bleeding problems or unexplained bruising. Patient instructed to continue current dosing schedule. Verbalizes understanding. Will recheck 1 month.  Patient instructed regarding medication; results given and questions answered. Nutritional counseling given.  Dietary factors affecting therapy addressed.  Patient instructed to monitor for excessive bruising or bleeding. Findings reported by Ivanna Hahn RN.    Today's INR is Lab Results - Last 18 Months   Lab Units 10/19/21  0000   INR  2.90*           Shanks... Total Volume Injected ([facility]- Only Use Numbers And Decimals): 0.8